# Patient Record
Sex: FEMALE | Race: WHITE | NOT HISPANIC OR LATINO | Employment: OTHER | URBAN - METROPOLITAN AREA
[De-identification: names, ages, dates, MRNs, and addresses within clinical notes are randomized per-mention and may not be internally consistent; named-entity substitution may affect disease eponyms.]

---

## 2018-11-28 ENCOUNTER — APPOINTMENT (OUTPATIENT)
Dept: RADIOLOGY | Facility: MEDICAL CENTER | Age: 78
DRG: 065 | End: 2018-11-28
Attending: EMERGENCY MEDICINE
Payer: COMMERCIAL

## 2018-11-28 ENCOUNTER — HOSPITAL ENCOUNTER (INPATIENT)
Facility: MEDICAL CENTER | Age: 78
LOS: 2 days | DRG: 065 | End: 2018-11-30
Attending: EMERGENCY MEDICINE | Admitting: INTERNAL MEDICINE
Payer: COMMERCIAL

## 2018-11-28 ENCOUNTER — HOSPITAL ENCOUNTER (OUTPATIENT)
Dept: RADIOLOGY | Facility: MEDICAL CENTER | Age: 78
End: 2018-11-28

## 2018-11-28 DIAGNOSIS — I63.9 ACUTE CVA (CEREBROVASCULAR ACCIDENT) (HCC): ICD-10-CM

## 2018-11-28 LAB
ABO GROUP BLD: NORMAL
ALBUMIN SERPL BCP-MCNC: 3.4 G/DL (ref 3.2–4.9)
ALBUMIN/GLOB SERPL: 1.2 G/DL
ALP SERPL-CCNC: 73 U/L (ref 30–99)
ALT SERPL-CCNC: 11 U/L (ref 2–50)
ANION GAP SERPL CALC-SCNC: 7 MMOL/L (ref 0–11.9)
APPEARANCE UR: CLEAR
APTT PPP: 28.6 SEC (ref 24.7–36)
AST SERPL-CCNC: 12 U/L (ref 12–45)
BACTERIA #/AREA URNS HPF: ABNORMAL /HPF
BASOPHILS # BLD AUTO: 0.3 % (ref 0–1.8)
BASOPHILS # BLD: 0.04 K/UL (ref 0–0.12)
BILIRUB SERPL-MCNC: 0.3 MG/DL (ref 0.1–1.5)
BILIRUB UR QL STRIP.AUTO: NEGATIVE
BLD GP AB SCN SERPL QL: NORMAL
BUN SERPL-MCNC: 21 MG/DL (ref 8–22)
CALCIUM SERPL-MCNC: 8.8 MG/DL (ref 8.5–10.5)
CHLORIDE SERPL-SCNC: 107 MMOL/L (ref 96–112)
CO2 SERPL-SCNC: 28 MMOL/L (ref 20–33)
COLOR UR: YELLOW
CREAT SERPL-MCNC: 0.53 MG/DL (ref 0.5–1.4)
EOSINOPHIL # BLD AUTO: 0.13 K/UL (ref 0–0.51)
EOSINOPHIL NFR BLD: 1.1 % (ref 0–6.9)
EPI CELLS #/AREA URNS HPF: ABNORMAL /HPF
ERYTHROCYTE [DISTWIDTH] IN BLOOD BY AUTOMATED COUNT: 46.2 FL (ref 35.9–50)
GLOBULIN SER CALC-MCNC: 2.8 G/DL (ref 1.9–3.5)
GLUCOSE BLD-MCNC: 96 MG/DL (ref 65–99)
GLUCOSE SERPL-MCNC: 104 MG/DL (ref 65–99)
GLUCOSE UR STRIP.AUTO-MCNC: NEGATIVE MG/DL
HCT VFR BLD AUTO: 40.6 % (ref 37–47)
HGB BLD-MCNC: 12.6 G/DL (ref 12–16)
IMM GRANULOCYTES # BLD AUTO: 0.06 K/UL (ref 0–0.11)
IMM GRANULOCYTES NFR BLD AUTO: 0.5 % (ref 0–0.9)
INR PPP: 1.21 (ref 0.87–1.13)
KETONES UR STRIP.AUTO-MCNC: NEGATIVE MG/DL
LEUKOCYTE ESTERASE UR QL STRIP.AUTO: NEGATIVE
LYMPHOCYTES # BLD AUTO: 2.7 K/UL (ref 1–4.8)
LYMPHOCYTES NFR BLD: 23.5 % (ref 22–41)
MCH RBC QN AUTO: 26.3 PG (ref 27–33)
MCHC RBC AUTO-ENTMCNC: 31 G/DL (ref 33.6–35)
MCV RBC AUTO: 84.8 FL (ref 81.4–97.8)
MICRO URNS: ABNORMAL
MONOCYTES # BLD AUTO: 0.91 K/UL (ref 0–0.85)
MONOCYTES NFR BLD AUTO: 7.9 % (ref 0–13.4)
NEUTROPHILS # BLD AUTO: 7.64 K/UL (ref 2–7.15)
NEUTROPHILS NFR BLD: 66.7 % (ref 44–72)
NITRITE UR QL STRIP.AUTO: NEGATIVE
NRBC # BLD AUTO: 0 K/UL
NRBC BLD-RTO: 0 /100 WBC
PH UR STRIP.AUTO: 6.5 [PH]
PLATELET # BLD AUTO: 241 K/UL (ref 164–446)
PMV BLD AUTO: 9.3 FL (ref 9–12.9)
POTASSIUM SERPL-SCNC: 3.8 MMOL/L (ref 3.6–5.5)
PROT SERPL-MCNC: 6.2 G/DL (ref 6–8.2)
PROT UR QL STRIP: NEGATIVE MG/DL
PROTHROMBIN TIME: 15.5 SEC (ref 12–14.6)
RBC # BLD AUTO: 4.79 M/UL (ref 4.2–5.4)
RBC # URNS HPF: ABNORMAL /HPF
RBC UR QL AUTO: ABNORMAL
RH BLD: NORMAL
SODIUM SERPL-SCNC: 142 MMOL/L (ref 135–145)
SP GR UR STRIP.AUTO: 1.01
TROPONIN I SERPL-MCNC: 0.01 NG/ML (ref 0–0.04)
UROBILINOGEN UR STRIP.AUTO-MCNC: 0.2 MG/DL
WBC # BLD AUTO: 11.5 K/UL (ref 4.8–10.8)
WBC #/AREA URNS HPF: ABNORMAL /HPF

## 2018-11-28 PROCEDURE — 700117 HCHG RX CONTRAST REV CODE 255: Performed by: EMERGENCY MEDICINE

## 2018-11-28 PROCEDURE — 70498 CT ANGIOGRAPHY NECK: CPT

## 2018-11-28 PROCEDURE — 99291 CRITICAL CARE FIRST HOUR: CPT

## 2018-11-28 PROCEDURE — 80053 COMPREHEN METABOLIC PANEL: CPT

## 2018-11-28 PROCEDURE — 70496 CT ANGIOGRAPHY HEAD: CPT

## 2018-11-28 PROCEDURE — 85025 COMPLETE CBC W/AUTO DIFF WBC: CPT

## 2018-11-28 PROCEDURE — 93005 ELECTROCARDIOGRAM TRACING: CPT | Performed by: EMERGENCY MEDICINE

## 2018-11-28 PROCEDURE — 81001 URINALYSIS AUTO W/SCOPE: CPT

## 2018-11-28 PROCEDURE — 86900 BLOOD TYPING SEROLOGIC ABO: CPT

## 2018-11-28 PROCEDURE — 85730 THROMBOPLASTIN TIME PARTIAL: CPT

## 2018-11-28 PROCEDURE — 0042T CT-CEREBRAL PERFUSION ANALYSIS: CPT

## 2018-11-28 PROCEDURE — 99223 1ST HOSP IP/OBS HIGH 75: CPT | Mod: AI | Performed by: INTERNAL MEDICINE

## 2018-11-28 PROCEDURE — 94760 N-INVAS EAR/PLS OXIMETRY 1: CPT

## 2018-11-28 PROCEDURE — 86901 BLOOD TYPING SEROLOGIC RH(D): CPT

## 2018-11-28 PROCEDURE — 96365 THER/PROPH/DIAG IV INF INIT: CPT

## 2018-11-28 PROCEDURE — 82962 GLUCOSE BLOOD TEST: CPT

## 2018-11-28 PROCEDURE — 770022 HCHG ROOM/CARE - ICU (200)

## 2018-11-28 PROCEDURE — 84484 ASSAY OF TROPONIN QUANT: CPT

## 2018-11-28 PROCEDURE — 86850 RBC ANTIBODY SCREEN: CPT

## 2018-11-28 PROCEDURE — 85610 PROTHROMBIN TIME: CPT

## 2018-11-28 PROCEDURE — 71045 X-RAY EXAM CHEST 1 VIEW: CPT

## 2018-11-28 PROCEDURE — 700111 HCHG RX REV CODE 636 W/ 250 OVERRIDE (IP): Performed by: EMERGENCY MEDICINE

## 2018-11-28 RX ORDER — NITROGLYCERIN 20 MG/100ML
0-200 INJECTION INTRAVENOUS CONTINUOUS
Status: DISCONTINUED | OUTPATIENT
Start: 2018-11-28 | End: 2018-11-30 | Stop reason: HOSPADM

## 2018-11-28 RX ADMIN — NITROGLYCERIN 5 MCG/MIN: 20 INJECTION INTRAVENOUS at 20:45

## 2018-11-28 RX ADMIN — IOHEXOL 140 ML: 350 INJECTION, SOLUTION INTRAVENOUS at 20:33

## 2018-11-29 ENCOUNTER — APPOINTMENT (OUTPATIENT)
Dept: RADIOLOGY | Facility: MEDICAL CENTER | Age: 78
DRG: 065 | End: 2018-11-29
Attending: INTERNAL MEDICINE
Payer: COMMERCIAL

## 2018-11-29 ENCOUNTER — APPOINTMENT (OUTPATIENT)
Dept: CARDIOLOGY | Facility: MEDICAL CENTER | Age: 78
DRG: 065 | End: 2018-11-29
Attending: INTERNAL MEDICINE
Payer: COMMERCIAL

## 2018-11-29 PROBLEM — Z72.0 TOBACCO USE: Status: ACTIVE | Noted: 2018-11-29

## 2018-11-29 PROBLEM — E78.5 HLD (HYPERLIPIDEMIA): Status: ACTIVE | Noted: 2018-11-29

## 2018-11-29 PROBLEM — I10 ESSENTIAL HYPERTENSION: Status: ACTIVE | Noted: 2018-11-29

## 2018-11-29 PROBLEM — E04.1 THYROID NODULE: Status: ACTIVE | Noted: 2018-11-29

## 2018-11-29 PROBLEM — D72.829 LEUKOCYTOSIS: Status: ACTIVE | Noted: 2018-11-29

## 2018-11-29 PROBLEM — I63.9 CVA (CEREBRAL VASCULAR ACCIDENT) (HCC): Status: ACTIVE | Noted: 2018-11-29

## 2018-11-29 PROBLEM — M06.9 RHEUMATOID ARTHRITIS (HCC): Status: ACTIVE | Noted: 2018-11-29

## 2018-11-29 LAB
ABO GROUP BLD: NORMAL
LV EJECT FRACT  99904: 65
LV EJECT FRACT MOD 2C 99903: 70.85
LV EJECT FRACT MOD 4C 99902: 65.3
LV EJECT FRACT MOD BP 99901: 68.34
RH BLD: NORMAL

## 2018-11-29 PROCEDURE — 99291 CRITICAL CARE FIRST HOUR: CPT | Performed by: INTERNAL MEDICINE

## 2018-11-29 PROCEDURE — A9270 NON-COVERED ITEM OR SERVICE: HCPCS | Performed by: HOSPITALIST

## 2018-11-29 PROCEDURE — G8996 SWALLOW CURRENT STATUS: HCPCS | Mod: CK

## 2018-11-29 PROCEDURE — 93306 TTE W/DOPPLER COMPLETE: CPT | Mod: 26 | Performed by: INTERNAL MEDICINE

## 2018-11-29 PROCEDURE — 700102 HCHG RX REV CODE 250 W/ 637 OVERRIDE(OP): Performed by: INTERNAL MEDICINE

## 2018-11-29 PROCEDURE — 70551 MRI BRAIN STEM W/O DYE: CPT

## 2018-11-29 PROCEDURE — A9270 NON-COVERED ITEM OR SERVICE: HCPCS | Performed by: INTERNAL MEDICINE

## 2018-11-29 PROCEDURE — 73030 X-RAY EXAM OF SHOULDER: CPT | Mod: RT

## 2018-11-29 PROCEDURE — 700102 HCHG RX REV CODE 250 W/ 637 OVERRIDE(OP): Performed by: HOSPITALIST

## 2018-11-29 PROCEDURE — 99221 1ST HOSP IP/OBS SF/LOW 40: CPT | Performed by: PSYCHIATRY & NEUROLOGY

## 2018-11-29 PROCEDURE — 99233 SBSQ HOSP IP/OBS HIGH 50: CPT | Performed by: HOSPITALIST

## 2018-11-29 PROCEDURE — 93306 TTE W/DOPPLER COMPLETE: CPT

## 2018-11-29 PROCEDURE — 92610 EVALUATE SWALLOWING FUNCTION: CPT

## 2018-11-29 PROCEDURE — 99292 CRITICAL CARE ADDL 30 MIN: CPT | Performed by: INTERNAL MEDICINE

## 2018-11-29 PROCEDURE — 700105 HCHG RX REV CODE 258: Performed by: INTERNAL MEDICINE

## 2018-11-29 PROCEDURE — 770022 HCHG ROOM/CARE - ICU (200)

## 2018-11-29 PROCEDURE — G8997 SWALLOW GOAL STATUS: HCPCS | Mod: CH

## 2018-11-29 PROCEDURE — 700111 HCHG RX REV CODE 636 W/ 250 OVERRIDE (IP): Performed by: HOSPITALIST

## 2018-11-29 PROCEDURE — 700111 HCHG RX REV CODE 636 W/ 250 OVERRIDE (IP): Performed by: INTERNAL MEDICINE

## 2018-11-29 RX ORDER — PANTOPRAZOLE SODIUM 20 MG/1
20 TABLET, DELAYED RELEASE ORAL DAILY
COMMUNITY

## 2018-11-29 RX ORDER — BISACODYL 10 MG
10 SUPPOSITORY, RECTAL RECTAL
Status: DISCONTINUED | OUTPATIENT
Start: 2018-11-29 | End: 2018-11-30 | Stop reason: HOSPADM

## 2018-11-29 RX ORDER — ASPIRIN 300 MG/1
300 SUPPOSITORY RECTAL DAILY
Status: DISCONTINUED | OUTPATIENT
Start: 2018-11-30 | End: 2018-11-29

## 2018-11-29 RX ORDER — OMEPRAZOLE 20 MG/1
20 CAPSULE, DELAYED RELEASE ORAL DAILY
Status: DISCONTINUED | OUTPATIENT
Start: 2018-11-29 | End: 2018-11-30 | Stop reason: HOSPADM

## 2018-11-29 RX ORDER — ONDANSETRON 2 MG/ML
4 INJECTION INTRAMUSCULAR; INTRAVENOUS EVERY 4 HOURS PRN
Status: DISCONTINUED | OUTPATIENT
Start: 2018-11-29 | End: 2018-11-30 | Stop reason: HOSPADM

## 2018-11-29 RX ORDER — ATORVASTATIN CALCIUM 40 MG/1
40 TABLET, FILM COATED ORAL EVERY EVENING
Status: DISCONTINUED | OUTPATIENT
Start: 2018-11-29 | End: 2018-11-30 | Stop reason: HOSPADM

## 2018-11-29 RX ORDER — MECLIZINE HYDROCHLORIDE 25 MG/1
25 TABLET ORAL 3 TIMES DAILY PRN
Status: ON HOLD | COMMUNITY
End: 2018-11-30

## 2018-11-29 RX ORDER — SODIUM CHLORIDE 9 MG/ML
INJECTION, SOLUTION INTRAVENOUS CONTINUOUS
Status: DISCONTINUED | OUTPATIENT
Start: 2018-11-29 | End: 2018-11-30 | Stop reason: HOSPADM

## 2018-11-29 RX ORDER — PREDNISONE 20 MG/1
20 TABLET ORAL DAILY
COMMUNITY

## 2018-11-29 RX ORDER — POLYETHYLENE GLYCOL 3350 17 G/17G
1 POWDER, FOR SOLUTION ORAL
Status: DISCONTINUED | OUTPATIENT
Start: 2018-11-29 | End: 2018-11-30 | Stop reason: HOSPADM

## 2018-11-29 RX ORDER — ASPIRIN 325 MG
325 TABLET ORAL DAILY
Status: DISCONTINUED | OUTPATIENT
Start: 2018-11-29 | End: 2018-11-30 | Stop reason: HOSPADM

## 2018-11-29 RX ORDER — ASPIRIN 81 MG/1
324 TABLET, CHEWABLE ORAL DAILY
Status: DISCONTINUED | OUTPATIENT
Start: 2018-11-29 | End: 2018-11-30 | Stop reason: HOSPADM

## 2018-11-29 RX ORDER — AMOXICILLIN 250 MG
2 CAPSULE ORAL 2 TIMES DAILY
Status: DISCONTINUED | OUTPATIENT
Start: 2018-11-29 | End: 2018-11-30 | Stop reason: HOSPADM

## 2018-11-29 RX ORDER — PANTOPRAZOLE SODIUM 20 MG/1
20 TABLET, DELAYED RELEASE ORAL DAILY
Status: DISCONTINUED | OUTPATIENT
Start: 2018-11-29 | End: 2018-11-29

## 2018-11-29 RX ORDER — ASPIRIN 81 MG/1
324 TABLET, CHEWABLE ORAL DAILY
Status: DISCONTINUED | OUTPATIENT
Start: 2018-11-30 | End: 2018-11-29

## 2018-11-29 RX ORDER — HYDRALAZINE HYDROCHLORIDE 20 MG/ML
10 INJECTION INTRAMUSCULAR; INTRAVENOUS
Status: DISCONTINUED | OUTPATIENT
Start: 2018-11-29 | End: 2018-11-30 | Stop reason: HOSPADM

## 2018-11-29 RX ORDER — HYDROXYCHLOROQUINE SULFATE 200 MG/1
400 TABLET, FILM COATED ORAL 2 TIMES DAILY
Status: DISCONTINUED | OUTPATIENT
Start: 2018-11-29 | End: 2018-11-30 | Stop reason: HOSPADM

## 2018-11-29 RX ORDER — LOSARTAN POTASSIUM 50 MG/1
75 TABLET ORAL DAILY
COMMUNITY

## 2018-11-29 RX ORDER — ASPIRIN 325 MG
325 TABLET ORAL DAILY
Status: DISCONTINUED | OUTPATIENT
Start: 2018-11-30 | End: 2018-11-29

## 2018-11-29 RX ORDER — HYDROXYCHLOROQUINE SULFATE 200 MG/1
400 TABLET, FILM COATED ORAL 2 TIMES DAILY
COMMUNITY

## 2018-11-29 RX ORDER — CELECOXIB 200 MG/1
200 CAPSULE ORAL 2 TIMES DAILY
Status: ON HOLD | COMMUNITY
End: 2018-11-30

## 2018-11-29 RX ORDER — PREDNISONE 20 MG/1
20 TABLET ORAL DAILY
Status: DISCONTINUED | OUTPATIENT
Start: 2018-11-29 | End: 2018-11-30 | Stop reason: HOSPADM

## 2018-11-29 RX ORDER — ONDANSETRON 4 MG/1
4 TABLET, ORALLY DISINTEGRATING ORAL EVERY 4 HOURS PRN
Status: DISCONTINUED | OUTPATIENT
Start: 2018-11-29 | End: 2018-11-30 | Stop reason: HOSPADM

## 2018-11-29 RX ORDER — ASPIRIN 300 MG/1
300 SUPPOSITORY RECTAL DAILY
Status: DISCONTINUED | OUTPATIENT
Start: 2018-11-29 | End: 2018-11-30 | Stop reason: HOSPADM

## 2018-11-29 RX ADMIN — SODIUM CHLORIDE: 9 INJECTION, SOLUTION INTRAVENOUS at 00:43

## 2018-11-29 RX ADMIN — ASPIRIN 325 MG: 325 TABLET, COATED ORAL at 18:38

## 2018-11-29 RX ADMIN — ATORVASTATIN CALCIUM 40 MG: 40 TABLET, FILM COATED ORAL at 16:59

## 2018-11-29 RX ADMIN — ENOXAPARIN SODIUM 40 MG: 100 INJECTION SUBCUTANEOUS at 19:14

## 2018-11-29 RX ADMIN — PREDNISONE 20 MG: 20 TABLET ORAL at 16:59

## 2018-11-29 RX ADMIN — HYDROXYCHLOROQUINE SULFATE 400 MG: 200 TABLET, FILM COATED ORAL at 17:12

## 2018-11-29 RX ADMIN — OMEPRAZOLE 20 MG: 20 CAPSULE, DELAYED RELEASE ORAL at 16:59

## 2018-11-29 RX ADMIN — STANDARDIZED SENNA CONCENTRATE AND DOCUSATE SODIUM 2 TABLET: 8.6; 5 TABLET, FILM COATED ORAL at 17:00

## 2018-11-29 ASSESSMENT — LIFESTYLE VARIABLES
EVER_SMOKED: YES
HAVE YOU EVER FELT YOU SHOULD CUT DOWN ON YOUR DRINKING: NO
HAVE PEOPLE ANNOYED YOU BY CRITICIZING YOUR DRINKING: NO
AVERAGE NUMBER OF DAYS PER WEEK YOU HAVE A DRINK CONTAINING ALCOHOL: 1
ON A TYPICAL DAY WHEN YOU DRINK ALCOHOL HOW MANY DRINKS DO YOU HAVE: 0
TOTAL SCORE: 0
CONSUMPTION TOTAL: POSITIVE
EVER FELT BAD OR GUILTY ABOUT YOUR DRINKING: NO
SUBSTANCE_ABUSE: 0
TOTAL SCORE: 0
HOW MANY TIMES IN THE PAST YEAR HAVE YOU HAD 5 OR MORE DRINKS IN A DAY: 1
ALCOHOL_USE: YES
EVER_SMOKED: YES
EVER HAD A DRINK FIRST THING IN THE MORNING TO STEADY YOUR NERVES TO GET RID OF A HANGOVER: NO
TOTAL SCORE: 0

## 2018-11-29 ASSESSMENT — ENCOUNTER SYMPTOMS
FEVER: 0
FLANK PAIN: 0
NUMBNESS: 0
FOCAL WEAKNESS: 1
COUGH: 0
DIARRHEA: 0
EYE PAIN: 0
SEIZURES: 0
FACIAL ASYMMETRY: 1
CHILLS: 0
NERVOUS/ANXIOUS: 0
NAUSEA: 0
SHORTNESS OF BREATH: 0
EYE DISCHARGE: 0
WHEEZING: 0
VOMITING: 0
FATIGUE: 0
ABDOMINAL PAIN: 0
EYE REDNESS: 0
FOCAL WEAKNESS: 0
DIZZINESS: 1
BACK PAIN: 1
SPUTUM PRODUCTION: 0
WEAKNESS: 1
NECK PAIN: 0
BLOOD IN STOOL: 0
LIGHT-HEADEDNESS: 0
BRUISES/BLEEDS EASILY: 0
BACK PAIN: 0
FALLS: 0
MYALGIAS: 0
BLURRED VISION: 0
SPEECH DIFFICULTY: 0
ORTHOPNEA: 0
SORE THROAT: 0
PALPITATIONS: 0
HALLUCINATIONS: 0
STRIDOR: 0
LOSS OF CONSCIOUSNESS: 0
SPEECH CHANGE: 0
DIAPHORESIS: 0
HEMOPTYSIS: 0
HEADACHES: 0
MEMORY LOSS: 0

## 2018-11-29 ASSESSMENT — COPD QUESTIONNAIRES
DURING THE PAST 4 WEEKS HOW MUCH DID YOU FEEL SHORT OF BREATH: NONE/LITTLE OF THE TIME
HAVE YOU SMOKED AT LEAST 100 CIGARETTES IN YOUR ENTIRE LIFE: YES
COPD SCREENING SCORE: 4
DO YOU EVER COUGH UP ANY MUCUS OR PHLEGM?: NO/ONLY WITH OCCASIONAL COLDS OR INFECTIONS

## 2018-11-29 ASSESSMENT — PAIN SCALES - GENERAL
PAINLEVEL_OUTOF10: 0

## 2018-11-29 ASSESSMENT — PATIENT HEALTH QUESTIONNAIRE - PHQ9
1. LITTLE INTEREST OR PLEASURE IN DOING THINGS: NOT AT ALL
2. FEELING DOWN, DEPRESSED, IRRITABLE, OR HOPELESS: NOT AT ALL
SUM OF ALL RESPONSES TO PHQ9 QUESTIONS 1 AND 2: 0

## 2018-11-29 NOTE — PROGRESS NOTES
· 2 RN skin check complete.   · Devices in place BP cuff, Silicone nasal cannula, SpO2 clip probe, Silva catheter, SCDs.  · Skin assessed under devices YES.  · Confirmed pressure ulcers found on n/a. Red but blanching elbows and redness/scab on R foot noted see assessment charting.  · New potential pressure ulcers noted on n/a.  · The following interventions in place Q2hr turns, pillows in use for support and positioning, low air loss mattress, heels floated off bed.

## 2018-11-29 NOTE — NON-PROVIDER
"CHIEF COMPLAINT:   \"stroke\" - but has no clue     HISTORY OF PRESENTING ILLNESS:   This afternoon patient was driving with her daughter to [employer] when she suddenly felt visually dizzy. She said the episode lasted for around 30 min and she subsequently let her daughter drive them home afterwards. That's when her recollection fades. She was able to tell the year and was also about to eat whole foods today so presents with no dysphasia. The symptoms suddenly happened. She's not able to recall anything else. She says she just woke up in the hospital afterwards. She did have a ministroke a few years ago and is a life time smoker. She's been smoking since the age of 20 and continues at about a half pack per day. She currently descries her vision as cross - eyed and sees two of everything.     CURRENT OUTPATIENT MEDICATIONS:   No current facility-administered medications on file prior to encounter.      No current outpatient prescriptions on file prior to encounter.       HOSPITAL MEDICATIONS:    Current Facility-Administered Medications:   •  nitroglycerin in D5W    Current Outpatient Prescriptions:   •  Celecoxib (CELEBREX PO), 1 Cap, Oral, BID, 11/28/2018 at 1000  •  PREDNISONE PO, 1 Tab, Oral, QAM, 11/28/2018 at 1000  •  LOVASTATIN PO, 1 Tab, Oral, QAM, 11/28/2018 at 1000      ALLERGIES:   No Known Allergies    PAST MEDICAL HISTORY:  Mini stroke   PAST SURGICAL HISTORY:  Hip replacements and shoulder surgery     SOCIAL HISTORY:  Social History     Social History   • Marital status:      Spouse name: N/A   • Number of children: N/A   • Years of education: N/A     Occupational History   • Not on file.     Social History Main Topics   • Smoking status: Not on file   • Smokeless tobacco: Not on file   • Alcohol use Not on file   • Drug use: Unknown   • Sexual activity: Not on file     Other Topics Concern   • Not on file     Social History Narrative   • No narrative on file       FAMILY HISTORY:  Not pertinent " "  REVIEW OF SYSTEMS:  Back pain due to the hospital bed     VITALS:  Weight/BMI: Body mass index is 27.44 kg/m².  Blood pressure 145/95, pulse 82, temperature 36.5 °C (97.7 °F), temperature source Temporal, resp. rate 15, height 1.676 m (5' 6\"), weight 77.1 kg (170 lb), SpO2 92 %.  Vitals:    11/28/18 2225 11/28/18 2230 11/28/18 2255 11/28/18 2325   BP:       Pulse: 74 76 80 82   Resp: 16 15 16 15   Temp:       TempSrc:       SpO2: 99% 98% 97% 92%   Weight:       Height:         Oxygen Therapy:  Pulse Oximetry: 92 %, O2 Delivery: None (Room Air)    PHYSICAL EXAM:  General - well developed, well nourished, no acute cardiorespiratory distress, AAOx4, anicteric, acyanotic***  HEENT: no conjunctival pallor, mucous membranes pink and moist, no lymphadenopathy, no thyromegaly or tenderness on palpation, no jugular venous distention***  Skin - warm, dry, no erythema ***  Cardiovascular - normal rate, normal rhythm, no murmurs heard, extremities with intact distal pulses, no edema ***  Respiratory - normal breath sounds, no shortness of breath, no wheezing, no rales, bilateral chest rise and fall ***  Gastrointestinal - soft, non-tender, non-distended, normal bowel sounds, no abdominal pain, no palpable hepatosplenomegaly, no masses ***  Musculoskeletal - normal muscle tone, no obvious deformities ***  Neurological - no tremor, normal judgement, affect, and mood ***      She has right sided weakness; facial droop on her left side; eyelid droop on her left side as well    She appears to have no sensory disturbances    Visual - crosseyed     coordintion - able to touch finger and hold hands out     prsents with no dysphagia    Denies headache    No nasuea comiting    Back pain - from the bed    Smokiong, aorting issues, drinks occasionally    Mild abdoninlal pain - getting a colonscopy done     LABS:                  ASSESSMENT and PLAN:      "

## 2018-11-29 NOTE — ED NOTES
Med rec partially completed.   Antibiotics within last 30 days: No  Patient allergies have been reviewed: NKDA  Comments: Pt unable to provide medications strengths. Med tech will attempt to follow up with pt's outpatient pharmacy.

## 2018-11-29 NOTE — CARE PLAN
Problem: Skin Integrity  Goal: Risk for impaired skin integrity will decrease    Intervention: Assess risk factors for impaired skin integrity and/or pressure ulcers  Patient is on a 24 hour bed rest post TPA. Patient is turned and repositioned every two hours using draw sheet and pillows. Extremities are resting on pillows. Pulse ox and BP cuff rotated.      Problem: Nutritional:  Goal: Dysphagia will improve    Intervention: Collaborate with SLP to determine appropriate adaptation for safe administration of medications and oral nutrition  Collaborating with SLP during swallow eval. Patient is able to tolerate a nectar thick full liquid diet. Medications are given with thick full liquids.

## 2018-11-29 NOTE — H&P
Hospital Medicine History & Physical Note    Date of Service  11/28/2018    Primary Care Physician  No primary care provider on file.    Consultants  Critical care    Code Status  Full code    Chief Complaint  Dizziness and right-sided weakness    History of Presenting Illness  77 y.o. female who presented 11/28/2018 with right-sided weakness and right facial droop that started earlier this afternoon.  Patient was last seen normal at 1630.  The daughter found her mother to have right-sided weakness and right facial droop.  The patient reported some dizziness and double vision that lasted for about 30 minutes.  [facility] without contrast was negative for acute process, she was determined to be having an ischemic stroke and was given IV TPA.  She was transferred to [facility] for further management.  In the ER she underwent CT head with IV contrast that revealed atherosclerotic intracranial calcification of the internal carotid arteries with less than 50% stenosis and hypoplastic distal left vertebral artery.  CTA neck with IV contrast reveals atherosclerotic calcification of the left common carotid artery and bilateral carotid bifurcation less than 50% stenosis.  CT perfusion scan was negative for ischemia or infarct.  At this time her right-sided weakness has improved.    EKG interpreted by me reveals sinus rhythm with ventricular premature complex.  No ST elevation or ST depression noted  Chest x-ray interpreted by me reveals no acute cardiopulmonary process    Review of Systems  Review of Systems   Constitutional: Negative for chills, diaphoresis and fever.   HENT: Negative for hearing loss and sore throat.    Eyes: Negative for blurred vision.   Respiratory: Negative for cough, sputum production, shortness of breath and wheezing.    Cardiovascular: Negative for chest pain, palpitations and leg swelling.   Gastrointestinal: Negative for abdominal pain, blood in stool,  diarrhea, nausea and vomiting.   Genitourinary: Negative for dysuria, flank pain and urgency.   Musculoskeletal: Negative for back pain, joint pain, myalgias and neck pain.   Skin: Negative for rash.   Neurological: Positive for dizziness and focal weakness. Negative for seizures and headaches.   Endo/Heme/Allergies: Does not bruise/bleed easily.   Psychiatric/Behavioral: Negative for suicidal ideas.   All other systems reviewed and are negative.      Past Medical History  Hyperlipidemia    Surgical History  No pertinent surgical history    Family History  No pertinent family history    Social History   Denies tobacco use.  Drinks alcohol occasionally.  Denies illicit drug use    Allergies  No Known Allergies    Medications  Prior to Admission Medications   Prescriptions Last Dose Informant Patient Reported? Taking?   Celecoxib (CELEBREX PO) 11/28/2018 at 1000  Yes Yes   Sig: Take 1 Cap by mouth 2 Times a Day.   LOVASTATIN PO 11/28/2018 at 1000  Yes Yes   Sig: Take 1 Tab by mouth every morning.   PREDNISONE PO 11/28/2018 at 1000  Yes Yes   Sig: Take 1 Tab by mouth every morning.      Facility-Administered Medications: None       Physical Exam  Temp:  [36.4 °C (97.6 °F)-36.5 °C (97.7 °F)] 36.4 °C (97.6 °F)  Pulse:  [74-85] 74  Resp:  [12-20] 20  BP: (145)/(95) 145/95    Physical Exam   Constitutional: She is oriented to person, place, and time. She appears well-developed and well-nourished. No distress.   HENT:   Head: Normocephalic and atraumatic.   Mouth/Throat: Oropharynx is clear and moist.   Eyes: Pupils are equal, round, and reactive to light. Conjunctivae are normal.   Neck: Neck supple. No thyromegaly present.   Cardiovascular: Normal rate, regular rhythm and normal heart sounds.    Pulmonary/Chest: Effort normal and breath sounds normal. No respiratory distress. She has no wheezes. She has no rales.   Abdominal: Soft. Bowel sounds are normal. She exhibits no distension. There is no tenderness. There is no  rebound.   Musculoskeletal: Normal range of motion. She exhibits no edema or tenderness.   Neurological: She is alert and oriented to person, place, and time. No cranial nerve deficit. Coordination normal.   Right upper extremity strength 4/5  Left upper extremity strength 5/5  Right upper extremity strength 4/5  Left upper extremity strength 5/5  Sensation intact bilaterally   Skin: Skin is warm and dry.   Psychiatric: She has a normal mood and affect. Her behavior is normal.   Nursing note and vitals reviewed.      Laboratory:  Recent Labs      11/28/18 2007   WBC  11.5*   RBC  4.79   HEMOGLOBIN  12.6   HEMATOCRIT  40.6   MCV  84.8   MCH  26.3*   MCHC  31.0*   RDW  46.2   PLATELETCT  241   MPV  9.3     Recent Labs      11/28/18 2007   SODIUM  142   POTASSIUM  3.8   CHLORIDE  107   CO2  28   GLUCOSE  104*   BUN  21   CREATININE  0.53   CALCIUM  8.8     Recent Labs      11/28/18 2007   ALTSGPT  11   ASTSGOT  12   ALKPHOSPHAT  73   TBILIRUBIN  0.3   GLUCOSE  104*     Recent Labs      11/28/18 2007   APTT  28.6   INR  1.21*             Recent Labs      11/28/18 2007   TROPONINI  0.01       Urinalysis:    Recent Labs      11/28/18   2132   SPECGRAVITY  1.010   GLUCOSEUR  Negative   KETONES  Negative   NITRITE  Negative   LEUKESTERAS  Negative   WBCURINE  2-5   RBCURINE  2-5*   BACTERIA  Many*   EPITHELCELL  Rare        Imaging:  DX-CHEST-PORTABLE (1 VIEW)   Final Result         1.  No acute cardiopulmonary disease.   2.  Atherosclerosis      CT-CTA NECK WITH & W/O-POST PROCESSING   Final Result         1.  Atherosclerotic calcification of the left common carotid artery and bilateral carotid bifurcation with less than 50% stenosis   2.  Hypoplastic left vertebral artery   3.  Otherwise unremarkable CT angiogram of the neck   4.  Right thyroid low density lesion, appearance suggests small cyst or hypodense nodule. Could be further evaluated with thyroid sonography.      CT-CTA HEAD WITH & W/O-POST PROCESS    Final Result         1.  Atherosclerotic intracranial calcification of the internal carotid arteries with less than 50% stenosis   2.  Hypoplastic distal left vertebral artery   3.  Otherwise CT angiogram of the [place] within normal limits.      OUTSIDE IMAGES-CT HEAD   Final Result      CT-CEREBRAL PERFUSION ANALYSIS   Final Result         1.  Cerebral blood flow less than 30% likely representing completed infarct = 0 mL.      2.  T Max more than 6 seconds likely representing combination of completed infarct and ischemia = 0 mL.      3.  Mismatched volume likely representing ischemic brain/pneumbra = 0 mL.      4.  Please note that the cerebral perfusion was performed on the limited brain tissue around the basal ganglia region. Infarct/ischemia outside the CT perfusion sections can be missed in this study.      EC-ECHOCARDIOGRAM COMPLETE W/O CONT    (Results Pending)   MR-BRAIN-W/O    (Results Pending)         Assessment/Plan:  I anticipate this patient will require at least two midnights for appropriate medical management, necessitating inpatient admission.    CVA (cerebral vascular accident) (HCC)- (present on admission)   Assessment & Plan    Status post TPA  Patient will be admitted to the ICU with continuous cardiac monitoring and every hour neuro checks  Strict blood pressure control with IV hydralazine and nitroglycerin drip, titrate to SBP less than 180  I will order MRI brain  Check 2-D echo  Patient will be started on full dose aspirin and DVT prophylaxis with Lovenox 24 hours after alteplase infusion   I have started on high intensity statin  Check TSH, lipid panel and hemoglobin A1c  PTOT and ST evaluation         Leukocytosis- (present on admission)   Assessment & Plan    Likely reactive  No evidence of infection at this time  Monitor CBC and vitals     HLD (hyperlipidemia)- (present on admission)   Assessment & Plan    Started on atorvastatin 40 mg daily         VTE prophylaxis: scd

## 2018-11-29 NOTE — ED NOTES
Report from Du RN.  Pt to room at 2030.  TPA stopped at 1947.  See Alteplase worksheet.  Vitals not charted for 2010 and 2025 due to patient being transported by trauma RN from charge desk to CT and to [facility].  NIHSS completed by ERP and ER RN at 2030.

## 2018-11-29 NOTE — PROGRESS NOTES
Report received from ED RN, neuro assessment completed in ED, pt transported to RICU 107 on monitor with ACLS RN and CCT, pt confirmed that she had no belongings with her, VSS, 2 L NC.

## 2018-11-29 NOTE — CARE PLAN
Problem: Acute Care of the Stroke Patient  Goal: Optimal Outcome for the Stroke patient    Intervention: Vital Signs and Neuro Checks per order  Completed, in process  Intervention: NIHSS completed and documented  NIHSS completed  Intervention: Telemetry monitoring (discontinue after 24 hours unless contraindicated. If monitoring required beyond 24 hours obtain order for monitored bed)  Pt on continuous EKG monitoring   Intervention: CT Scan results (document hemorrhage or no)  No hemmorhage, CT complete  Intervention: Consider MRI/Consider MRA  MRI ordered  Intervention: Consider Echocardiogram  ECHO ordered  Intervention: PT, PTT, INR per anticoagulation orders if applicable  complete  Intervention: Neuro Consult  Neuro consult ordered

## 2018-11-29 NOTE — ASSESSMENT & PLAN NOTE
Allow for permissive hypertension  PRN hydralazine for SBP greater than 180 or DBP greater than 105

## 2018-11-29 NOTE — THERAPY
"Speech Language Therapy Clinical Swallow Evaluation completed.  Functional Status: Patient strict NPO pending evaluation and eager for PO trials. Patient noted to have slight labial droop at rest, but this appears much improved s/p tPA compared to initial notes. Patient with no other gross deficits during oral motor evaluation. Patient consumed PO trials of single ice chips, NTL via tsp, cup sip, and straw, purees, pudding, soft solids, and thin liquids via tsp and cup sip. Patient presented with intermittent throat clearing on thins via cup sip. Coughing was noted x2 out of 4 bites of soft solids. No other overt s/sx of aspiration were noted on any other consistencies trialed. Laryngeal elevation palpated as weak. Patient required assistance with feeding d/t RUE weakness. Recommend patient start NTFL diet with 1:1 assistance for feeding. Crush meds in puree. RN aware. SLP to follow. Thank you for the consult.     Recommendations - Diet: Nectar Thick Full Liquid                          Strategies: Strict 1:1 feeding, Assistance needed for meal tray set-up and Head of Bed at 90 Degrees                          Medication Administration: Crush all Medications in Puree  Plan of Care: Will benefit from Speech Therapy 5 times per week  Post-Acute Therapy: Recommend inpatient transitional care services for continued speech therapy services. Please consider physiatry (PM&R) consult.       See \"Rehab Therapy-Acute\" Patient Summary Report for complete documentation.   "

## 2018-11-29 NOTE — ED PROVIDER NOTES
ED Provider Note    CHIEF COMPLAINT  Chief Complaint   Patient presents with   • Possible Stroke     tpa given, right sided weakness, left sided facial droop.        BUDDY Nolan is a 77 y.o. female who presents for evaluation of right-sided weakness.  Onset was around this afternoon and was apparently witnessed although it is unclear who witnessed.  Patient was seen at the outside facility determined to be having an ischemic stroke and given TPA.  She was then transferred by helicopter medevac to this facility.  Patient was met at the charge desk by me, she appeared drowsy but was arousable.  She had right-sided facial droop and right-sided extremity weakness upon brief neurologic exam and was taken directly to CT for perfusion and angiography imaging.    REVIEW OF SYSTEMS  Constitutional: No recent fevers  Skin: No rashes  HEENT: No ear pain, ringing in ears, or decreased hearing. No sore throat, runny nose, sores, trouble swallowing, trouble speaking.  Neck: No neck pain, stiffness, or masses.  Chest: No pain or rashes  Pulm: No shortness of breath, cough, wheezing, stridor, or pain with inspiration/expiration  Gastrointestinal: No nausea, vomiting, diarrhea  Genitourinary: No dysuria or hematuria  Musculoskeletal: No recent trauma, pain, swelling, weakness  Neurologic: Right-sided weakness, disorientation  Heme: No bleeding or bruising problems.   Immuno: No hx of recurrent infections      PAST MEDICAL HISTORY       SOCIAL HISTORY  Social History     Social History Main Topics   • Smoking status: Not on file   • Smokeless tobacco: Not on file   • Alcohol use Not on file   • Drug use: Unknown   • Sexual activity: Not on file       SURGICAL HISTORY  patient denies any surgical history    CURRENT MEDICATIONS  Home Medications     Reviewed by Radhika Bolden (Pharmacy Tech) on 11/28/18 at 2316  Med List Status: Partial   Medication Last Dose Status   Celecoxib (CELEBREX PO) 11/28/2018 Active   LOVASTATIN PO  "11/28/2018 Active   PREDNISONE PO 11/28/2018 Active                ALLERGIES  No Known Allergies    PHYSICAL EXAM  VITAL SIGNS: /95   Pulse 75   Temp 36.5 °C (97.7 °F) (Temporal)   Resp 16   Ht 1.676 m (5' 6\")   Wt 77.1 kg (170 lb)   SpO2 97%   BMI 27.44 kg/m²    Gen: Appears tired, groggy  HEENT: No signs of trauma, Bilateral external ears normal, Nose normal. Conjunctiva normal, Non-icteric.  PERRLA, EOMI  Neck:  No tenderness, Supple, No masses  Lymphatic: No cervical lymphadenopathy noted.   Cardiovascular: Regular rate and rhythm  Thorax & Lungs: Normal breath sounds, No respiratory distress, No wheezing bilateral chest rise  Abdomen: Bowel sounds normal, Soft, No tenderness, No masses, No pulsatile masses. No Guarding or rebound  Skin: Warm, Dry, No erythema, No rash.   Extremities: Intact distal pulses, No edema  Neurologic:CN 2: Visual acuity grossly intact, visual fields grossly intact  CN 2-3: Pupils equal round reactive to light and accommodation  CN 3, 4, 6: Extraocular eye movements intact  CN 5: Facial sensation intact to light touch bilaterally  CN 7: Face somewhat asymmetric with droop on the right  CN 8: Hearing intact to finger rub bilaterally  CN 9,10: Swallowing normally, soft palate elevates normally  CN 4, 7, 10, 12: Weak voice with some dysarthria and slurring of speech  CN 11: Strong shoulder shrug, good strength with head rotation  CN 12: No deviation of the tongue    Motor:   RUE: 4 out of 5 strength proximally and distally, mild pronator and arm drift  LUE:5 out of 5 strength proximally and distally, no pronator or arm drift  RLE:5 out of 5 strength proximally and distally, no leg drift  LLE:5 out of 5 strength proximally and distally, no leg drift    Sensory:  RUE: Sensation intact to light touch, equal bilat  RLE:  Sensation intact to light touch, equal bilat  LUE: Sensation intact to light touch, equal bilat  LLE: Sensation intact to light touch, equal bilat    Finger to " nose demonstrates mild ataxia on the right, no ataxia on the left.  Heel to shin difficult for the patient due to generalized debility but, despite being a bit slow, patient is able to coordinate both lower extremities  Psychiatric: Affect normal, Judgment normal, Mood normal.       INITIAL IMPRESSION  Patient has a findings suggestive of an acute ischemic CVA which is at least partially resolved.  She is noted to have an NIH score of 8 by my evaluation.    LABS  Results for orders placed or performed during the hospital encounter of 11/28/18   CBC WITH DIFFERENTIAL   Result Value Ref Range    WBC 11.5 (H) 4.8 - 10.8 K/uL    RBC 4.79 4.20 - 5.40 M/uL    Hemoglobin 12.6 12.0 - 16.0 g/dL    Hematocrit 40.6 37.0 - 47.0 %    MCV 84.8 81.4 - 97.8 fL    MCH 26.3 (L) 27.0 - 33.0 pg    MCHC 31.0 (L) 33.6 - 35.0 g/dL    RDW 46.2 35.9 - 50.0 fL    Platelet Count 241 164 - 446 K/uL    MPV 9.3 9.0 - 12.9 fL    Neutrophils-Polys 66.70 44.00 - 72.00 %    Lymphocytes 23.50 22.00 - 41.00 %    Monocytes 7.90 0.00 - 13.40 %    Eosinophils 1.10 0.00 - 6.90 %    Basophils 0.30 0.00 - 1.80 %    Immature Granulocytes 0.50 0.00 - 0.90 %    Nucleated RBC 0.00 /100 WBC    Neutrophils (Absolute) 7.64 (H) 2.00 - 7.15 K/uL    Lymphs (Absolute) 2.70 1.00 - 4.80 K/uL    Monos (Absolute) 0.91 (H) 0.00 - 0.85 K/uL    Eos (Absolute) 0.13 0.00 - 0.51 K/uL    Baso (Absolute) 0.04 0.00 - 0.12 K/uL    Immature Granulocytes (abs) 0.06 0.00 - 0.11 K/uL    NRBC (Absolute) 0.00 K/uL   COMP METABOLIC PANEL   Result Value Ref Range    Sodium 142 135 - 145 mmol/L    Potassium 3.8 3.6 - 5.5 mmol/L    Chloride 107 96 - 112 mmol/L    Co2 28 20 - 33 mmol/L    Anion Gap 7.0 0.0 - 11.9    Glucose 104 (H) 65 - 99 mg/dL    Bun 21 8 - 22 mg/dL    Creatinine 0.53 0.50 - 1.40 mg/dL    Calcium 8.8 8.5 - 10.5 mg/dL    AST(SGOT) 12 12 - 45 U/L    ALT(SGPT) 11 2 - 50 U/L    Alkaline Phosphatase 73 30 - 99 U/L    Total Bilirubin 0.3 0.1 - 1.5 mg/dL    Albumin 3.4 3.2 - 4.9  g/dL    Total Protein 6.2 6.0 - 8.2 g/dL    Globulin 2.8 1.9 - 3.5 g/dL    A-G Ratio 1.2 g/dL   PROTHROMBIN TIME   Result Value Ref Range    PT 15.5 (H) 12.0 - 14.6 sec    INR 1.21 (H) 0.87 - 1.13   APTT   Result Value Ref Range    APTT 28.6 24.7 - 36.0 sec   COD (ADULT)   Result Value Ref Range    ABO Grouping Only O     Rh Grouping Only POS     Antibody Screen-Cod NEG    TROPONIN   Result Value Ref Range    Troponin I 0.01 0.00 - 0.04 ng/mL   URINALYSIS CULTURE, IF INDICATED   Result Value Ref Range    Micro Urine Req Microscopic     Color Yellow     Character Clear     Specific Gravity 1.010 <1.035    Ph 6.5 5.0 - 8.0    Glucose Negative Negative mg/dL    Ketones Negative Negative mg/dL    Protein Negative Negative mg/dL    Bilirubin Negative Negative    Urobilinogen, Urine 0.2 Negative    Nitrite Negative Negative    Leukocyte Esterase Negative Negative    Occult Blood Trace (A) Negative   ESTIMATED GFR   Result Value Ref Range    GFR If African American >60 >60 mL/min/1.73 m 2    GFR If Non African American >60 >60 mL/min/1.73 m 2   URINE MICROSCOPIC (W/UA)   Result Value Ref Range    WBC 2-5 /hpf    RBC 2-5 (A) /hpf    Bacteria Many (A) None /hpf    Epithelial Cells Rare /hpf   ACCU-CHEK GLUCOSE   Result Value Ref Range    Glucose - Accu-Ck 96 65 - 99 mg/dL   EKG (NOW)   Result Value Ref Range    Report       [facility].    Test Date:  2018  Pt Name:    RONN JETT                 Department: ER  MRN:        [id number]                      Room:       [facility]  Gender:     Female                       Technician: [id number]  :        1940                   Requested By:RISHI ARRIAZA  Order #:    [id number]                    Moni MD:    Measurements  Intervals                                Axis  Rate:       75                           P:          28  HI:         188                          QRS:        -22  QRSD:       96                           T:           19  QT:         420  QTc:        470    Interpretive Statements  SINUS RHYTHM  VENTRICULAR PREMATURE COMPLEX  BORDERLINE LEFT AXIS DEVIATION  CONSIDER ANTERIOR INFARCT  No previous ECG available for comparison         RADIOLOGY  DX-CHEST-PORTABLE (1 VIEW)   Final Result         1.  No acute cardiopulmonary disease.   2.  Atherosclerosis      CT-CTA NECK WITH & W/O-POST PROCESSING   Final Result         1.  Atherosclerotic calcification of the left common carotid artery and bilateral carotid bifurcation with less than 50% stenosis   2.  Hypoplastic left vertebral artery   3.  Otherwise unremarkable CT angiogram of the neck   4.  Right thyroid low density lesion, appearance suggests small cyst or hypodense nodule. Could be further evaluated with thyroid sonography.      CT-CTA HEAD WITH & W/O-POST PROCESS   Final Result         1.  Atherosclerotic intracranial calcification of the internal carotid arteries with less than 50% stenosis   2.  Hypoplastic distal left vertebral artery   3.  Otherwise CT angiogram of the [place] within normal limits.      OUTSIDE IMAGES-CT HEAD   Final Result      CT-CEREBRAL PERFUSION ANALYSIS   Final Result         1.  Cerebral blood flow less than 30% likely representing completed infarct = 0 mL.      2.  T Max more than 6 seconds likely representing combination of completed infarct and ischemia = 0 mL.      3.  Mismatched volume likely representing ischemic brain/pneumbra = 0 mL.      4.  Please note that the cerebral perfusion was performed on the limited brain tissue around the basal ganglia region. Infarct/ischemia outside the CT perfusion sections can be missed in this study.      EC-ECHOCARDIOGRAM COMPLETE W/O CONT    (Results Pending)   MR-BRAIN-W/O    (Results Pending)   Critical Care Note  Upon my evaluation, this patient had high probability of imminent and life-threatening deterioration due to ischemic stroke, which required my direct attention, intervention, and  personal management. I personally provided 35 minutes of critical care time exclusive of time spent on separately billable procedures. Time includes review of laboratory data, radiology results, discussion with consultants, and monitoring for potential decompensation.       Reevaluation   Time:10:02 PM  Vital signs: Reviewed per nursing note  Assessment: Improving, smiling, conversant      COURSE & MEDICAL DECISION MAKING  Pertinent Labs & Imaging studies reviewed. (See chart for details)  Patient has a finding suggestive of improving ischemic CVA, most likely in the left hemisphere.  Patient had no convincing CT evidence here to suggest the need for interventional radiology and is notable that her perfusion scan was normal.  She had no other evidence to suggest a more likely etiology to explain her symptoms prior to arrival and will be admitted to the intensive care unit under the care of the hospitalist service    FINAL IMPRESSION  1. Acute CVA (cerebrovascular accident) (HCC)        Electronically signed by: Reynaldo Caraballo, 11/28/2018 8:26 PM

## 2018-11-29 NOTE — ASSESSMENT & PLAN NOTE
Status post TPA    Continue close clinical monitoring with serial neurologic exams  Continue atorvastatin  Follow-up on lipid panel  Start aspirin 24 hours post TPA  PT/OT/SLP  Consult neurology discussed with Dr. CARRILLO who will see her  Follow-up on MRI

## 2018-11-29 NOTE — ED TRIAGE NOTES
"Chief Complaint   Patient presents with   • Possible Stroke     tpa given, right sided weakness, left sided facial droop.      /95   Pulse 77   Temp 36.5 °C (97.7 °F) (Temporal)   Resp 12   Ht 1.676 m (5' 6\")   Wt 77.1 kg (170 lb)   SpO2 100%   BMI 27.44 kg/m²     BIB EMS, transfer from [place], PT was eating dinner with  when she experienced right sided weakness, left sided facial droop and slurred speech. Last known normal of 1630. Taken to outlying ED where she received TPA, bolus at 1825, as well as started on a nitro gtt for htn. Transferred to [facility] for higher level of care. Once at [facility] pt was taken to ct.    "

## 2018-11-29 NOTE — THERAPY
OT orders received. Pt on bedrest for alteplase. Will attempt as appropriate and able.    Jesica Lai, OTR/L  Pager: [phone]

## 2018-11-29 NOTE — CONSULTS
Critical Care Consultation    Date of consult: 11/29/2018    Referring Physician  No att. providers found    Reason for Consultation  Post alteplase    History of Presenting Illness  77 y.o. female who was brought to ED by daughter after developed right sided weakness, right facial droop while eating dinner, last seen normal at 1630. Pt received tPA at 1825. CT head was done at OSH and was unremarkable.  At ED arrival, pt's BP was 140/90s, HR 70s, CTA head and neck showed <50% stenosis of atherosclerotic intracranial calcification. Post tpa pt's right sided weakness seems to significantly improve. Pt was transferred to ICU post alteplase infusion.     Of note, pt is a current smoker 0.5 ppd >40 years. Drinks occasionally 2 liquor drinks/month. No known hx of CAD, diabetes, or TIA.     At ICU arrival, pt alert, oriented, hemodynamically stable. /80s, HR in 80s. Denies any chest pain, SOB, abd pain, +back pain. No headache. Oriented x2 to place and time    Code Status  Full Code    Review of Systems  Review of Systems   Constitutional: Negative for fatigue and fever.   Respiratory: Negative for shortness of breath and wheezing.    Cardiovascular: Negative for chest pain, palpitations and leg swelling.   Gastrointestinal: Negative for abdominal pain, diarrhea, nausea and vomiting.   Genitourinary: Negative for dysuria, flank pain and frequency.   Musculoskeletal: Positive for back pain.   Neurological: Positive for facial asymmetry. Negative for seizures, speech difficulty, light-headedness, numbness and headaches.       Past Medical History   has no past medical history on file.    Surgical History   has no past surgical history on file.    Family History  family history is not on file.    Social History       Medications  Home Medications     Reviewed by Radhika Bolden (Pharmacy Tech) on 11/28/18 at 2316  Med List Status: Partial   Medication Last Dose Status   Celecoxib (CELEBREX PO) 11/28/2018 Active    LOVASTATIN PO 11/28/2018 Active   PREDNISONE PO 11/28/2018 Active              Current Facility-Administered Medications   Medication Dose Route Frequency Provider Last Rate Last Dose   • senna-docusate (PERICOLACE or SENOKOT S) 8.6-50 MG per tablet 2 Tab  2 Tab Oral BID Du Ruffin M.D.        And   • polyethylene glycol/lytes (MIRALAX) PACKET 1 Packet  1 Packet Oral QDAY PRN Du Ruffin M.D.        And   • magnesium hydroxide (MILK OF MAGNESIA) suspension 30 mL  30 mL Oral QDAY PRN Du Ruffin M.D.        And   • bisacodyl (DULCOLAX) suppository 10 mg  10 mg Rectal QDAY PRN Du Ruffin M.D.       • Respiratory Care per Protocol   Nebulization Continuous RT Du Ruffin M.D.       • NS infusion   Intravenous Continuous Du Ruffin M.D. 50 mL/hr at 11/29/18 0043     • atorvastatin (LIPITOR) tablet 40 mg  40 mg Oral Q EVENING Du Ruffin M.D.       • hydrALAZINE (APRESOLINE) injection 10 mg  10 mg Intravenous Q2HRS PRN Du Ruffin M.D.       • [START ON 11/30/2018] aspirin (ASA) tablet 325 mg  325 mg Oral DAILY Du Ruffin M.D.        Or   • [START ON 11/30/2018] aspirin (ASA) chewable tab 324 mg  324 mg Oral DAILY Du Ruffin M.D.        Or   • [START ON 11/30/2018] aspirin (ASA) suppository 300 mg  300 mg Rectal DAILY Du Ruffin M.D.       • ondansetron (ZOFRAN) syringe/vial injection 4 mg  4 mg Intravenous Q4HRS PRN Du Ruffin M.D.       • ondansetron (ZOFRAN ODT) dispertab 4 mg  4 mg Oral Q4HRS PRN Du Ruffin M.D.       • nitroglycerin 50 mg in D5W 250 ml infusion  0-200 mcg/min Intravenous Continuous Reynaldo Caraballo M.D.   Stopped at 11/28/18 2204       Allergies  No Known Allergies    Vital Signs last 24 hours  Temp:  [36.5 °C (97.7 °F)] 36.5 °C (97.7 °F)  Pulse:  [74-85] 75  Resp:  [12-20] 16  BP: (145)/(95) 145/95    Physical Exam  Physical Exam   Constitutional: She appears well-nourished. No distress.   HENT:   Head: Normocephalic and atraumatic.   Neck: Neck supple.   Cardiovascular:  Normal rate, regular rhythm and normal heart sounds.    No murmur heard.  Pulmonary/Chest: Effort normal and breath sounds normal. No respiratory distress. She has no wheezes. She has no rales.   Abdominal: Soft. Bowel sounds are normal. She exhibits no distension. There is no tenderness. There is no rebound and no guarding.   Neurological: She is alert. No cranial nerve deficit. She exhibits normal muscle tone. Coordination normal.   + slight right facial droop  CN II, III, IV, VI, V, IX, X, XI, XII all intact  No cerebellar dysfunction with finger to nose test    Skin: Skin is warm. No rash noted. No erythema.   Psychiatric: She has a normal mood and affect.   Nursing note and vitals reviewed.      Fluids  No intake or output data in the 24 hours ending 11/29/18 0123    Laboratory  Recent Results (from the past 48 hour(s))   CBC WITH DIFFERENTIAL    Collection Time: 11/28/18  8:07 PM   Result Value Ref Range    WBC 11.5 (H) 4.8 - 10.8 K/uL    RBC 4.79 4.20 - 5.40 M/uL    Hemoglobin 12.6 12.0 - 16.0 g/dL    Hematocrit 40.6 37.0 - 47.0 %    MCV 84.8 81.4 - 97.8 fL    MCH 26.3 (L) 27.0 - 33.0 pg    MCHC 31.0 (L) 33.6 - 35.0 g/dL    RDW 46.2 35.9 - 50.0 fL    Platelet Count 241 164 - 446 K/uL    MPV 9.3 9.0 - 12.9 fL    Neutrophils-Polys 66.70 44.00 - 72.00 %    Lymphocytes 23.50 22.00 - 41.00 %    Monocytes 7.90 0.00 - 13.40 %    Eosinophils 1.10 0.00 - 6.90 %    Basophils 0.30 0.00 - 1.80 %    Immature Granulocytes 0.50 0.00 - 0.90 %    Nucleated RBC 0.00 /100 WBC    Neutrophils (Absolute) 7.64 (H) 2.00 - 7.15 K/uL    Lymphs (Absolute) 2.70 1.00 - 4.80 K/uL    Monos (Absolute) 0.91 (H) 0.00 - 0.85 K/uL    Eos (Absolute) 0.13 0.00 - 0.51 K/uL    Baso (Absolute) 0.04 0.00 - 0.12 K/uL    Immature Granulocytes (abs) 0.06 0.00 - 0.11 K/uL    NRBC (Absolute) 0.00 K/uL   COMP METABOLIC PANEL    Collection Time: 11/28/18  8:07 PM   Result Value Ref Range    Sodium 142 135 - 145 mmol/L    Potassium 3.8 3.6 - 5.5 mmol/L     Chloride 107 96 - 112 mmol/L    Co2 28 20 - 33 mmol/L    Anion Gap 7.0 0.0 - 11.9    Glucose 104 (H) 65 - 99 mg/dL    Bun 21 8 - 22 mg/dL    Creatinine 0.53 0.50 - 1.40 mg/dL    Calcium 8.8 8.5 - 10.5 mg/dL    AST(SGOT) 12 12 - 45 U/L    ALT(SGPT) 11 2 - 50 U/L    Alkaline Phosphatase 73 30 - 99 U/L    Total Bilirubin 0.3 0.1 - 1.5 mg/dL    Albumin 3.4 3.2 - 4.9 g/dL    Total Protein 6.2 6.0 - 8.2 g/dL    Globulin 2.8 1.9 - 3.5 g/dL    A-G Ratio 1.2 g/dL   PROTHROMBIN TIME    Collection Time: 18  8:07 PM   Result Value Ref Range    PT 15.5 (H) 12.0 - 14.6 sec    INR 1.21 (H) 0.87 - 1.13   APTT    Collection Time: 18  8:07 PM   Result Value Ref Range    APTT 28.6 24.7 - 36.0 sec   COD (ADULT)    Collection Time: 18  8:07 PM   Result Value Ref Range    ABO Grouping Only O     Rh Grouping Only POS     Antibody Screen-Cod NEG    TROPONIN    Collection Time: 18  8:07 PM   Result Value Ref Range    Troponin I 0.01 0.00 - 0.04 ng/mL   ESTIMATED GFR    Collection Time: 18  8:07 PM   Result Value Ref Range    GFR If African American >60 >60 mL/min/1.73 m 2    GFR If Non African American >60 >60 mL/min/1.73 m 2   ACCU-CHEK GLUCOSE    Collection Time: 18  8:07 PM   Result Value Ref Range    Glucose - Accu-Ck 96 65 - 99 mg/dL   EKG (NOW)    Collection Time: 18  8:50 PM   Result Value Ref Range    Report       [facility] Emergency Dept.    Test Date:  2018  Pt Name:    RONN JETT                 Department: ER  MRN:        [id number]                      Room:       [facility]  Gender:     Female                       Technician: [id number]  :        1940                   Requested By:RISHI ARRIAZA  Order #:    [id number]                    Moni MD:    Measurements  Intervals                                Axis  Rate:       75                           P:          28  MD:         188                          QRS:        -22  QRSD:       96                            T:          19  QT:         420  QTc:        470    Interpretive Statements  SINUS RHYTHM  VENTRICULAR PREMATURE COMPLEX  BORDERLINE LEFT AXIS DEVIATION  CONSIDER ANTERIOR INFARCT  No previous ECG available for comparison     URINALYSIS CULTURE, IF INDICATED    Collection Time: 11/28/18  9:32 PM   Result Value Ref Range    Micro Urine Req Microscopic     Color Yellow     Character Clear     Specific Gravity 1.010 <1.035    Ph 6.5 5.0 - 8.0    Glucose Negative Negative mg/dL    Ketones Negative Negative mg/dL    Protein Negative Negative mg/dL    Bilirubin Negative Negative    Urobilinogen, Urine 0.2 Negative    Nitrite Negative Negative    Leukocyte Esterase Negative Negative    Occult Blood Trace (A) Negative   URINE MICROSCOPIC (W/UA)    Collection Time: 11/28/18  9:32 PM   Result Value Ref Range    WBC 2-5 /hpf    RBC 2-5 (A) /hpf    Bacteria Many (A) None /hpf    Epithelial Cells Rare /hpf       Imaging  DX-CHEST-PORTABLE (1 VIEW)   Final Result         1.  No acute cardiopulmonary disease.   2.  Atherosclerosis      CT-CTA NECK WITH & W/O-POST PROCESSING   Final Result         1.  Atherosclerotic calcification of the left common carotid artery and bilateral carotid bifurcation with less than 50% stenosis   2.  Hypoplastic left vertebral artery   3.  Otherwise unremarkable CT angiogram of the neck   4.  Right thyroid low density lesion, appearance suggests small cyst or hypodense nodule. Could be further evaluated with thyroid sonography.      CT-CTA HEAD WITH & W/O-POST PROCESS   Final Result         1.  Atherosclerotic intracranial calcification of the internal carotid arteries with less than 50% stenosis   2.  Hypoplastic distal left vertebral artery   3.  Otherwise CT angiogram of the [place] within normal limits.      OUTSIDE IMAGES-CT HEAD   Final Result      CT-CEREBRAL PERFUSION ANALYSIS   Final Result         1.  Cerebral blood flow less than 30% likely representing completed infarct = 0  mL.      2.  T Max more than 6 seconds likely representing combination of completed infarct and ischemia = 0 mL.      3.  Mismatched volume likely representing ischemic brain/pneumbra = 0 mL.      4.  Please note that the cerebral perfusion was performed on the limited brain tissue around the basal ganglia region. Infarct/ischemia outside the CT perfusion sections can be missed in this study.      EC-ECHOCARDIOGRAM COMPLETE W/O CONT    (Results Pending)   MR-BRAIN-W/O    (Results Pending)       Assessment/Plan  CVA (cerebral vascular accident) ([facility])   Assessment & Plan    S/p alteplase with near resolution of her neurological symptoms  Watch for complications of alteplase that includes intracerebral hemorrhage, systemic bleeding.   - Frequent neuro check Q1H  - Goal to keep BP <180/105 in the first 24 hours.   - MRI brain   - Start aspirin after at least 24 hours of alteplase infusion and after MRI brain   - TTE  - US carotids  - Transfuse when Hgb <7         Discussed patient condition and risk of morbidity and/or mortality with RN and Patient.    Pt is at risk of bleeding after alteplase infusion. Need close monitoring of neuro check, labs and hemodynamics.     The patient remains critically ill.  Critical care time = 40 minutes in directly providing and coordinating critical care and extensive data review.  No time overlap and excludes procedures.    Tate Lopez, DO  Critical Care

## 2018-11-29 NOTE — THERAPY
PT consult received, Pt with active bedrest orders s/p Alteplase.  Will re attempt as able/appropriate.     Ana Oleary PT/DPT  Pager# [phone]

## 2018-11-29 NOTE — PROGRESS NOTES
This is in addition to Dr Lopez's note earlier today.    Pt post TPA, remains on q1 hour neuro checks, right sided weakness appears to be improving. Pt remains slightly confused to the situation, also complains of rt shoulder pain.     i will continue q1 hour neuro checks until 24hr post tpa  Start asa 24hr post tpa  High intensity statin  Echo, tele, mri brain  Lipid panel, tsh, hba1c  Pt/ot/speech evals    Additional critical care time, not including billable procedures 35 minutes

## 2018-11-29 NOTE — ASSESSMENT & PLAN NOTE
S/p alteplase with near resolution of her neurological symptoms  Near resolution of weakness/sensory changes  Post 24 hour window for tpa  Keep sbp < 140/90  MRI left thalamic infarct  On losartan  Asa /lovenox ok to start  Echo normal  Conservative transfusion goal, for < 7

## 2018-11-29 NOTE — PROGRESS NOTES
[facility] Hospitalist Progress Note    Date of Service: 2018    Chief Complaint  77 y.o. female admitted 2018 with stoke s/p TPA    Interval Problem Update  AOx4  Rt facial droop improved  -170  NPO pending SLP  Tmax 98.8  NS at 50   Rt shoulder xray  Asa        Consultants/Specialty  Neurology  Critical care    Disposition  To be determined        Review of Systems   Constitutional: Negative for fever and malaise/fatigue.   HENT: Negative for congestion, ear discharge and nosebleeds.    Eyes: Negative for blurred vision, pain, discharge and redness.   Respiratory: Negative for cough, hemoptysis, sputum production, shortness of breath and stridor.    Cardiovascular: Negative for chest pain, palpitations, orthopnea and leg swelling.   Gastrointestinal: Negative for abdominal pain, blood in stool, diarrhea, melena, nausea and vomiting.   Genitourinary: Negative for dysuria, flank pain and hematuria.   Musculoskeletal: Negative for back pain, falls, joint pain and neck pain.   Skin: Negative.    Neurological: Positive for weakness. Negative for speech change, focal weakness, seizures, loss of consciousness and headaches.   Psychiatric/Behavioral: Negative for hallucinations, memory loss and substance abuse. The patient is not nervous/anxious.    All other systems reviewed and are negative.     Physical Exam  Laboratory/Imaging   Hemodynamics  Temp (24hrs), Av.4 °C (97.6 °F), Min:36.4 °C (97.6 °F), Max:36.5 °C (97.7 °F)   Temperature: 36.4 °C (97.6 °F), Monitored Temp: 37.1 °C (98.8 °F)  Pulse  Av.5  Min: 71  Max: 85 Heart Rate (Monitored): 80  Blood Pressure : 145/95, NIBP: 127/76      Respiratory      Respiration: 16, Pulse Oximetry: 94 %, O2 Daily Delivery Respiratory : Silicone Nasal Cannula        RUL Breath Sounds: Clear, RML Breath Sounds: Clear, RLL Breath Sounds: Diminished, JOHNSON Breath Sounds: Clear, LLL Breath Sounds: Diminished    Fluids    Intake/Output Summary (Last 24 hours) at  11/29/18 0912  Last data filed at 11/29/18 0800   Gross per 24 hour   Intake           364.17 ml   Output              825 ml   Net          -460.83 ml       Nutrition  Orders Placed This Encounter   Procedures   • Diet NPO     Standing Status:   Standing     Number of Occurrences:   1     Order Specific Question:   Restrict to:     Answer:   Strict [1]     Physical Exam   Constitutional: She is oriented to person, place, and time. She appears well-developed and well-nourished.   HENT:   Head: Normocephalic and atraumatic.   Right Ear: External ear normal.   Left Ear: External ear normal.   Mouth/Throat: No oropharyngeal exudate.   Eyes: Conjunctivae are normal. Right eye exhibits no discharge. Left eye exhibits no discharge. No scleral icterus.   Neck: Neck supple. No JVD present. No tracheal deviation present.   Cardiovascular: Normal rate and regular rhythm.  Exam reveals no gallop and no friction rub.    No murmur heard.  Pulmonary/Chest: Effort normal and breath sounds normal. No stridor. No respiratory distress. She has no wheezes. She has no rales. She exhibits no tenderness.   Abdominal: Soft. Bowel sounds are normal. She exhibits no distension and no mass. There is no tenderness. There is no rebound and no guarding.   Musculoskeletal: She exhibits no edema or tenderness.   Neurological: She is alert and oriented to person, place, and time. A cranial nerve deficit is present. She exhibits abnormal muscle tone.   Mild right hemiparesis   Skin: Skin is warm and dry. She is not diaphoretic. No cyanosis or erythema. Nails show no clubbing.   Psychiatric: She has a normal mood and affect. Her behavior is normal.   Nursing note and vitals reviewed.      Recent Labs      11/28/18 2007   WBC  11.5*   RBC  4.79   HEMOGLOBIN  12.6   HEMATOCRIT  40.6   MCV  84.8   MCH  26.3*   MCHC  31.0*   RDW  46.2   PLATELETCT  241   MPV  9.3     Recent Labs      11/28/18 2007   SODIUM  142   POTASSIUM  3.8   CHLORIDE  107   CO2   28   GLUCOSE  104*   BUN  21   CREATININE  0.53   CALCIUM  8.8     Recent Labs      11/28/18 2007   APTT  28.6   INR  1.21*                  Assessment/Plan     CVA (cerebral vascular accident) (HCC)- (present on admission)   Assessment & Plan    Status post TPA    Continue close clinical monitoring with serial neurologic exams  Continue atorvastatin  Follow-up on lipid panel  Start aspirin 24 hours post TPA  PT/OT/SLP  Consult neurology discussed with Dr. CARRILLO who will see her  Follow-up on MRI       Thyroid nodule   Assessment & Plan    Check TSH     Rheumatoid arthritis (HCC)   Assessment & Plan    Resume home dose of prednisone and Plaquenil     Essential hypertension   Assessment & Plan    Allow for permissive hypertension  PRN hydralazine for SBP greater than 180 or DBP greater than 105     Tobacco use   Assessment & Plan    Patient counseled on smoking cessation     Leukocytosis- (present on admission)   Assessment & Plan    Likely reactive  No evidence of infection at this time  Monitor CBC and vitals     HLD (hyperlipidemia)- (present on admission)   Assessment & Plan    Started on atorvastatin 40 mg daily       Quality-Core Measures   Reviewed items::  Labs reviewed, Medications reviewed and Radiology images reviewed  Silva catheter::  Critically Ill - Requiring Accurate Measurement of Urinary Output  DVT prophylaxis - mechanical:  SCDs  Assessed for rehabilitation services:  Patient was assess for and/or received rehabilitation services during this hospitalization

## 2018-11-29 NOTE — CARE PLAN
Problem: Safety  Goal: Will remain free from falls  Outcome: PROGRESSING AS EXPECTED  Risk for falls assessed, bed alarm on, bed locked and in lowest position, pt room near nurses station, pt rounded on q1hr, call light in place. Pt and family educated on fall risk precautions and need to call RN before getting up.      Problem: Venous Thromboembolism (VTW)/Deep Vein Thrombosis (DVT) Prevention:  Goal: Patient will participate in Venous Thrombosis (VTE)/Deep Vein Thrombosis (DVT)Prevention Measures  Outcome: PROGRESSING AS EXPECTED  Pt assessed for any s/s of DVT, SCDs in place and turned on, ROM performed.

## 2018-11-29 NOTE — CONSULTS
"Chief Complaint   Patient presents with   • Possible Stroke     tpa given, right sided weakness, left sided facial droop.        Problem List Items Addressed This Visit     None      Visit Diagnoses     Acute CVA (cerebrovascular accident) (HCC)        Relevant Medications    nitroglycerin 50 mg in D5W 250 ml infusion    atorvastatin (LIPITOR) tablet 40 mg (Start on 11/29/2018  6:00 PM)    hydrALAZINE (APRESOLINE) injection 10 mg    enoxaparin (LOVENOX) inj 40 mg (Start on 11/29/2018  6:30 PM)    losartan (COZAAR) 50 MG Tab          History of present illness:  This is a 77-year old female with PMHx significant for dyslipidemia, hypertension, tobacco abuse (1/2 ppd) and remote history of stroke with mild residual Right sided deficits at baseline who presented to [facility] on 11/28/18 for a chief complaint of worsening Right sided weakness and right facial weakness per daughter. The patient is a poor historian, further details of HPI obtained via review of patient's medical record.   The patient was apparently last seen in her usual state of health around 1630 on 11/28; she was apparently driving to the store when she suddenly became \"dizzy.\" Patient admits that her \"vision felt off,\" however has cannot further describe or characterize this sensation. Patient's daughter (whom patient was with at the time) apparently noted worsening Right sided weakness and Right facial droop as well, thus she was brought to an OSH for further evaluation. CT Brain at that time revealed no acute intracranial abnormality; CTA without acute thrombus or LVO; patient's exclusion criteria was reviewed and patient was determined to be a candidate for IV tPA. Patient received IV tPA bolus at approximately 1845 on 11/28/18.   Currently, patient is sitting up in bed; briskly arousable to voice. States that she feels \"almost back to normal.\" Denies headache or dizziness. Denies weakness, numbness or tingling to any part of the body; " admits to mild RUE/RLE to which she admits is baseline. Denies problems with vision (double vision, blurred vision, or loss of vision), speech or swallowing. Denies recent falls or recent trauma/head trauma.      Neurology has been consulted by Dr. Du Ruffin to further evaluate the findings noted above.     Past medical history:   As noted above.     Past surgical history:   Non contributory.     Family history:   Non contributory.     Social history:   Social History     Social History   • Marital status:      Spouse name: N/A   • Number of children: N/A   • Years of education: N/A     Occupational History   • Not on file.     Social History Main Topics   • Smoking status: Not on file   • Smokeless tobacco: Not on file   • Alcohol use Not on file   • Drug use: Unknown   • Sexual activity: Not on file     Other Topics Concern   • Not on file     Social History Narrative   • No narrative on file       Current medications:   Current Facility-Administered Medications   Medication Dose   • senna-docusate (PERICOLACE or SENOKOT S) 8.6-50 MG per tablet 2 Tab  2 Tab    And   • polyethylene glycol/lytes (MIRALAX) PACKET 1 Packet  1 Packet    And   • magnesium hydroxide (MILK OF MAGNESIA) suspension 30 mL  30 mL    And   • bisacodyl (DULCOLAX) suppository 10 mg  10 mg   • Respiratory Care per Protocol     • NS infusion     • atorvastatin (LIPITOR) tablet 40 mg  40 mg   • hydrALAZINE (APRESOLINE) injection 10 mg  10 mg   • ondansetron (ZOFRAN) syringe/vial injection 4 mg  4 mg   • ondansetron (ZOFRAN ODT) dispertab 4 mg  4 mg   • aspirin (ASA) tablet 325 mg  325 mg    Or   • aspirin (ASA) chewable tab 324 mg  324 mg    Or   • aspirin (ASA) suppository 300 mg  300 mg   • enoxaparin (LOVENOX) inj 40 mg  40 mg   • hydroxychloroquine (PLAQUENIL) tablet 400 mg  400 mg   • predniSONE (DELTASONE) tablet 20 mg  20 mg   • omeprazole (PRILOSEC) capsule 20 mg  20 mg   • nitroglycerin 50 mg in D5W 250 ml infusion  0-200 mcg/min        Medication Allergy:  No Known Allergies    Review of systems:   Constitutional: denies fever, night sweats, weight loss.   Eyes: denies acute vision change, eye pain or secretion.   Ears, Nose, Mouth, Throat: denies nasal secretion, nasal bleeding, difficulty swallowing, hearing loss, tinnitus, vertigo, ear pain, acute dental problems, oral ulcers or lesions.   Endocrine: denies recent weight changes, heat or cold intolerance, polyuria, polydypsia, polyphagia,abnormal hair growth.  Cardiovascular: denies new onset of chest pain, palpitations, syncope, or dyspnea of exertion.  Pulmonary: denies shortness of breath, new onset of cough, hemoptysis, wheezing, chest pain or flu-like symptoms.   GI: denies nausea, vomiting, diarrhea, GI bleeding, change in appetite, abdominal pain, and change in bowel habits.  : denies dysuria, urinary incontinence, hematuria.  Heme/oncology: denies history of easy bruising or bleeding. No history of cancer, DVTor PE.  Allergy/immunology: denies hives/urticaria, or itching.   Dermatologic: denies new rash, or new skin lesions.  Musculoskeletal:denies joint swelling or pain, muscle pain, neck and back pain.   Neurologic: As noted above.   Psychiatric: denies symptoms of depression, suicidal or homicidal thoughts.      Physical examination:   Vitals:    11/29/18 1300 11/29/18 1400 11/29/18 1500 11/29/18 1600   BP:       Pulse: 79 77 87 82   Resp: 18 20 16 17   Temp:       TempSrc: Silva Silva Silva Silva   SpO2: 94% 94% 96% 93%   Weight:       Height:         General: Patient in no acute distress, pleasant and cooperative.  HEENT: Normocephalic, no signs of acute trauma.   Neck: supple, no meningeal signs or carotid bruits. There is normal range of motion. No tenderness on exam.   Chest: clear to auscultation. No cough.   CV: RRR, no murmurs.   Skin: no signs of acute rashes or trauma.   Musculoskeletal: joints exhibit full range of motion, without any pain to palpation. There are  no signs of joint or muscle swelling. There is no tenderness to deep palpation of muscles.   Psychiatric: No hallucinatory behavior. Denies symptoms of depression or suicidal ideation. Mood and affect appear normal on exam.     NEUROLOGICAL EXAM:   Mental status, orientation: Awake, alert and fully oriented.   Speech and language: speech is clear and fluent, non-dysarthric. The patient is able to name, repeat and comprehend.   Memory: There is intact recollection of recent and remote events.   Cranial nerve exam: Pupils are 3-4 mm bilaterally and equally reactive to light and accommodation. Visual fields are intact by confrontation. There is no nystagmus on primary or secondary gaze. Intact full EOM in all directions of gaze. Face appears symmetric. Sensation in the face is intact to light touch. Tongue is midline and without any signs of tongue biting or fasciculations. Sternocleidomastoid muscles exhibit is normal strength bilaterally. Shoulder shrug is intact bilaterally.   Motor exam: Strength is 5/5 in LUE/LLE. Strength 4+/5 to RUE/RLE with very mild drift to RUE only. Tone is normal. No abnormal movements were seen on exam.   Sensory exam reveals normal sense of light touch and pinprick in all extremities.   Deep tendon reflexes:  2+ throughout. Plantar responses are flexor. There is no clonus.   Coordination: shows a normal finger-nose-finger. Normal rapidly alternating movements.   Gait: Not assessed at this time.     NIH Stroke Scale    1a Level of Consciousness   1b Orientation Questions   1c Response to Commands   2 Gaze   3 Visual Fields   4 Facial Movement   5 Motor Function (arm)   a Left   b Right 1  6 Motor Function (leg)   a Left   b Right   7 Limb Ataxia   8 Sensory   9 Language   10 Articulation   11 Extinction/Inattention     Score: 1    ANCILLARY DATA REVIEWED:     Lab Data Review:  Recent Results (from the past 24 hour(s))   CBC WITH DIFFERENTIAL    Collection Time: 11/28/18  8:07 PM   Result  Value Ref Range    WBC 11.5 (H) 4.8 - 10.8 K/uL    RBC 4.79 4.20 - 5.40 M/uL    Hemoglobin 12.6 12.0 - 16.0 g/dL    Hematocrit 40.6 37.0 - 47.0 %    MCV 84.8 81.4 - 97.8 fL    MCH 26.3 (L) 27.0 - 33.0 pg    MCHC 31.0 (L) 33.6 - 35.0 g/dL    RDW 46.2 35.9 - 50.0 fL    Platelet Count 241 164 - 446 K/uL    MPV 9.3 9.0 - 12.9 fL    Neutrophils-Polys 66.70 44.00 - 72.00 %    Lymphocytes 23.50 22.00 - 41.00 %    Monocytes 7.90 0.00 - 13.40 %    Eosinophils 1.10 0.00 - 6.90 %    Basophils 0.30 0.00 - 1.80 %    Immature Granulocytes 0.50 0.00 - 0.90 %    Nucleated RBC 0.00 /100 WBC    Neutrophils (Absolute) 7.64 (H) 2.00 - 7.15 K/uL    Lymphs (Absolute) 2.70 1.00 - 4.80 K/uL    Monos (Absolute) 0.91 (H) 0.00 - 0.85 K/uL    Eos (Absolute) 0.13 0.00 - 0.51 K/uL    Baso (Absolute) 0.04 0.00 - 0.12 K/uL    Immature Granulocytes (abs) 0.06 0.00 - 0.11 K/uL    NRBC (Absolute) 0.00 K/uL   COMP METABOLIC PANEL    Collection Time: 11/28/18  8:07 PM   Result Value Ref Range    Sodium 142 135 - 145 mmol/L    Potassium 3.8 3.6 - 5.5 mmol/L    Chloride 107 96 - 112 mmol/L    Co2 28 20 - 33 mmol/L    Anion Gap 7.0 0.0 - 11.9    Glucose 104 (H) 65 - 99 mg/dL    Bun 21 8 - 22 mg/dL    Creatinine 0.53 0.50 - 1.40 mg/dL    Calcium 8.8 8.5 - 10.5 mg/dL    AST(SGOT) 12 12 - 45 U/L    ALT(SGPT) 11 2 - 50 U/L    Alkaline Phosphatase 73 30 - 99 U/L    Total Bilirubin 0.3 0.1 - 1.5 mg/dL    Albumin 3.4 3.2 - 4.9 g/dL    Total Protein 6.2 6.0 - 8.2 g/dL    Globulin 2.8 1.9 - 3.5 g/dL    A-G Ratio 1.2 g/dL   PROTHROMBIN TIME    Collection Time: 11/28/18  8:07 PM   Result Value Ref Range    PT 15.5 (H) 12.0 - 14.6 sec    INR 1.21 (H) 0.87 - 1.13   APTT    Collection Time: 11/28/18  8:07 PM   Result Value Ref Range    APTT 28.6 24.7 - 36.0 sec   COD (ADULT)    Collection Time: 11/28/18  8:07 PM   Result Value Ref Range    ABO Grouping Only O     Rh Grouping Only POS     Antibody Screen-Cod NEG    TROPONIN    Collection Time: 11/28/18  8:07 PM   Result  Value Ref Range    Troponin I 0.01 0.00 - 0.04 ng/mL   ESTIMATED GFR    Collection Time: 18  8:07 PM   Result Value Ref Range    GFR If African American >60 >60 mL/min/1.73 m 2    GFR If Non African American >60 >60 mL/min/1.73 m 2   ACCU-CHEK GLUCOSE    Collection Time: 18  8:07 PM   Result Value Ref Range    Glucose - Accu-Ck 96 65 - 99 mg/dL   EKG (NOW)    Collection Time: 18  8:50 PM   Result Value Ref Range    Report       [facility] Emergency Dept.    Test Date:  2018  Pt Name:    RONN JETT                 Department: ER  MRN:        [id number]                      Room:       [facility]  Gender:     Female                       Technician: [id number]  :        [id number]                   Requested By:RISHI ARRIAZA  Order #:    [id number]                    Reading MD:    Measurements  Intervals                                Axis  Rate:       75                           P:          28  MI:         188                          QRS:        -22  QRSD:       96                           T:          19  QT:         420  QTc:        470    Interpretive Statements  SINUS RHYTHM  VENTRICULAR PREMATURE COMPLEX  BORDERLINE LEFT AXIS DEVIATION  CONSIDER ANTERIOR INFARCT  No previous ECG available for comparison     URINALYSIS CULTURE, IF INDICATED    Collection Time: 18  9:32 PM   Result Value Ref Range    Micro Urine Req Microscopic     Color Yellow     Character Clear     Specific Gravity 1.010 <1.035    Ph 6.5 5.0 - 8.0    Glucose Negative Negative mg/dL    Ketones Negative Negative mg/dL    Protein Negative Negative mg/dL    Bilirubin Negative Negative    Urobilinogen, Urine 0.2 Negative    Nitrite Negative Negative    Leukocyte Esterase Negative Negative    Occult Blood Trace (A) Negative   URINE MICROSCOPIC (W/UA)    Collection Time: 18  9:32 PM   Result Value Ref Range    WBC 2-5 /hpf    RBC 2-5 (A) /hpf    Bacteria Many (A) None /hpf    Epithelial Cells Rare  /hpf   ABO AND RH CONFIRMATION    Collection Time: 11/29/18  2:05 AM   Result Value Ref Range    ABO Confirm O     Second Rh Group POS        Labs reviewed by me.       Imaging reviewed by me:     EC-ECHOCARDIOGRAM COMPLETE W/O CONT         MR-BRAIN-W/O   Final Result         1. Age-related cerebral atrophy.      2. Mild periventricular white matter changes consistent with chronic microvascular ischemic gliosis.      3. Acute left thalamic infarct.      DX-SHOULDER 2+ RIGHT   Final Result      1.  No acute osseous abnormality.   2.  Moderate to severe acromioclavicular and glenohumeral arthrosis.      DX-CHEST-PORTABLE (1 VIEW)   Final Result         1.  No acute cardiopulmonary disease.   2.  Atherosclerosis      CT-CTA NECK WITH & W/O-POST PROCESSING   Final Result         1.  Atherosclerotic calcification of the left common carotid artery and bilateral carotid bifurcation with less than 50% stenosis   2.  Hypoplastic left vertebral artery   3.  Otherwise unremarkable CT angiogram of the neck   4.  Right thyroid low density lesion, appearance suggests small cyst or hypodense nodule. Could be further evaluated with thyroid sonography.      CT-CTA HEAD WITH & W/O-POST PROCESS   Final Result         1.  Atherosclerotic intracranial calcification of the internal carotid arteries with less than 50% stenosis   2.  Hypoplastic distal left vertebral artery   3.  Otherwise CT angiogram of the [place] within normal limits.      OUTSIDE IMAGES-CT HEAD   Final Result      CT-CEREBRAL PERFUSION ANALYSIS   Final Result         1.  Cerebral blood flow less than 30% likely representing completed infarct = 0 mL.      2.  T Max more than 6 seconds likely representing combination of completed infarct and ischemia = 0 mL.      3.  Mismatched volume likely representing ischemic brain/pneumbra = 0 mL.      4.  Please note that the cerebral perfusion was performed on the limited brain tissue around the basal ganglia region.  Infarct/ischemia outside the CT perfusion sections can be missed in this study.        CT: No acute intracranial abnormality.   CTA Brain/neck: No hemodynamically significant (>50%) stenosis.       Presumed mechanism by TOAST:  __Large Artery Atherosclerosis  _X_Small Vessel (Lacunar)  __Cardioembolic  __Other (Sickle Cell, Vasculitis, Hypercoagulable)  __Unknown    Vs. Left PCA territory.     ASSESSMENT AND PLAN:  77-year old female with dyslipidemia, hypertension, tobacco abuse (1/2 ppd) and remote history of stroke with mild residual Right sided deficits at baseline who presented to [facility] on 11/28/18 for a chief complaint of worsening Right sided weakness and right facial weakness, also with non-specific visual deficit. Patient received IV tPA at 1845 on 11/28/18; NIHSS currently 1, significant only for slight RUE weakness/drift. Note patient's MRI Brain has revealed small acute ischemic infarction involving Left thalamus, small vessel/lacunar etiology vs. Left PCA territory.     Impression:   Acute Left thalamic infarction; lacunar etiology vs. Left PCA territory.   History of multiple vascular risk factors for stroke, including dyslipidemia, hypertension, and tobacco abuse.    Remote history of ischemic stroke.     Recommendations/Plan:    -q4h and PRN neuro assessment. VS per nursing/unit protocol. SBP < 180.   -Telemetry; Screen for Afib. If no Afib identified during admission, patient may be appropriate for outpatient Loop/holter monitoring to be arranged by PCP/Cardiology. TTE with bubble study completed, results pending.   -OK to initiate ASA, no hemorrhage per repeat imaging/MRI.   -Atorvastatin 40 mg PO q HS. Lipid panel pending.   -Recommend aggressive BG management per primary team. A1c pending.   -PT/OT/SLP eval and treat. Physiatry consult.    -Counseled patient at length regarding life style and risk factor modification (including importance of smoking cessation) for secondary stroke  prevention.   -All other medical management per primary team.   -DVT Prophylaxis: SCDs.     The plan of care above has been discussed with Dr. Gomes.     Shaye Armas MSN, BSN, AGNP-C  [facility]

## 2018-11-29 NOTE — PROGRESS NOTES
· 2 RN skin check complete.   · Devices in place: pulse ox, SCD's, silicone nasal cannula, blood pressure cuff.  · Skin assessed under devices: pulse ox, SCD's, silicone nasal cannula, blood pressure cuff.  · Confirmed pressure ulcers found on n/a.  · New potential pressure ulcers noted on n/a. Patient has a skin tear on her right arm from a previous fall and a scab on her right foot, second toe, oh which are both prior to arrival. Elbows are both red and blanching.  · The following interventions in place: Q2 turns using pillows and draw sheet, extremities resting on pillows, skin under SCD's, silicone nasal cannula, and blood pressure cuff assessed during assessments, pulse ox rotated.

## 2018-11-30 ENCOUNTER — HOSPITAL ENCOUNTER (INPATIENT)
Facility: REHABILITATION | Age: 78
LOS: 1 days | DRG: 057 | End: 2018-11-30
Attending: PHYSICAL MEDICINE & REHABILITATION | Admitting: PHYSICAL MEDICINE & REHABILITATION
Payer: COMMERCIAL

## 2018-11-30 VITALS
BODY MASS INDEX: 25.55 KG/M2 | SYSTOLIC BLOOD PRESSURE: 139 MMHG | WEIGHT: 168.6 LBS | TEMPERATURE: 98.6 F | RESPIRATION RATE: 18 BRPM | HEART RATE: 88 BPM | DIASTOLIC BLOOD PRESSURE: 85 MMHG | HEIGHT: 68 IN | OXYGEN SATURATION: 91 %

## 2018-11-30 VITALS
BODY MASS INDEX: 27.25 KG/M2 | RESPIRATION RATE: 18 BRPM | HEART RATE: 78 BPM | TEMPERATURE: 98.4 F | DIASTOLIC BLOOD PRESSURE: 95 MMHG | SYSTOLIC BLOOD PRESSURE: 145 MMHG | HEIGHT: 66 IN | OXYGEN SATURATION: 94 % | WEIGHT: 169.53 LBS

## 2018-11-30 PROBLEM — D72.829 LEUKOCYTOSIS: Status: RESOLVED | Noted: 2018-11-29 | Resolved: 2018-11-30

## 2018-11-30 PROBLEM — R13.10 DYSPHAGIA: Status: ACTIVE | Noted: 2018-11-30

## 2018-11-30 LAB
ANION GAP SERPL CALC-SCNC: 6 MMOL/L (ref 0–11.9)
BASOPHILS # BLD AUTO: 0.2 % (ref 0–1.8)
BASOPHILS # BLD: 0.02 K/UL (ref 0–0.12)
BNP SERPL-MCNC: 36 PG/ML (ref 0–100)
BUN SERPL-MCNC: 11 MG/DL (ref 8–22)
CALCIUM SERPL-MCNC: 8.7 MG/DL (ref 8.5–10.5)
CHLORIDE SERPL-SCNC: 108 MMOL/L (ref 96–112)
CO2 SERPL-SCNC: 27 MMOL/L (ref 20–33)
CREAT SERPL-MCNC: 0.45 MG/DL (ref 0.5–1.4)
EOSINOPHIL # BLD AUTO: 0.09 K/UL (ref 0–0.51)
EOSINOPHIL NFR BLD: 1 % (ref 0–6.9)
ERYTHROCYTE [DISTWIDTH] IN BLOOD BY AUTOMATED COUNT: 46.4 FL (ref 35.9–50)
GLUCOSE SERPL-MCNC: 102 MG/DL (ref 65–99)
HCT VFR BLD AUTO: 37.5 % (ref 37–47)
HGB BLD-MCNC: 11.8 G/DL (ref 12–16)
IMM GRANULOCYTES # BLD AUTO: 0.05 K/UL (ref 0–0.11)
IMM GRANULOCYTES NFR BLD AUTO: 0.5 % (ref 0–0.9)
LYMPHOCYTES # BLD AUTO: 2.35 K/UL (ref 1–4.8)
LYMPHOCYTES NFR BLD: 25.7 % (ref 22–41)
MCH RBC QN AUTO: 26.9 PG (ref 27–33)
MCHC RBC AUTO-ENTMCNC: 31.5 G/DL (ref 33.6–35)
MCV RBC AUTO: 85.6 FL (ref 81.4–97.8)
MONOCYTES # BLD AUTO: 0.7 K/UL (ref 0–0.85)
MONOCYTES NFR BLD AUTO: 7.6 % (ref 0–13.4)
NEUTROPHILS # BLD AUTO: 5.95 K/UL (ref 2–7.15)
NEUTROPHILS NFR BLD: 65 % (ref 44–72)
NRBC # BLD AUTO: 0 K/UL
NRBC BLD-RTO: 0 /100 WBC
PLATELET # BLD AUTO: 228 K/UL (ref 164–446)
PMV BLD AUTO: 9.5 FL (ref 9–12.9)
POTASSIUM SERPL-SCNC: 4.1 MMOL/L (ref 3.6–5.5)
RBC # BLD AUTO: 4.38 M/UL (ref 4.2–5.4)
SODIUM SERPL-SCNC: 141 MMOL/L (ref 135–145)
TSH SERPL DL<=0.005 MIU/L-ACNC: 1.92 UIU/ML (ref 0.38–5.33)
WBC # BLD AUTO: 9.2 K/UL (ref 4.8–10.8)

## 2018-11-30 PROCEDURE — 700102 HCHG RX REV CODE 250 W/ 637 OVERRIDE(OP): Performed by: PHYSICAL MEDICINE & REHABILITATION

## 2018-11-30 PROCEDURE — 99232 SBSQ HOSP IP/OBS MODERATE 35: CPT | Performed by: PSYCHIATRY & NEUROLOGY

## 2018-11-30 PROCEDURE — G8979 MOBILITY GOAL STATUS: HCPCS | Mod: CI

## 2018-11-30 PROCEDURE — 99406 BEHAV CHNG SMOKING 3-10 MIN: CPT

## 2018-11-30 PROCEDURE — 83880 ASSAY OF NATRIURETIC PEPTIDE: CPT

## 2018-11-30 PROCEDURE — 94760 N-INVAS EAR/PLS OXIMETRY 1: CPT

## 2018-11-30 PROCEDURE — 99233 SBSQ HOSP IP/OBS HIGH 50: CPT | Performed by: INTERNAL MEDICINE

## 2018-11-30 PROCEDURE — A9270 NON-COVERED ITEM OR SERVICE: HCPCS | Performed by: PHYSICAL MEDICINE & REHABILITATION

## 2018-11-30 PROCEDURE — 97162 PT EVAL MOD COMPLEX 30 MIN: CPT

## 2018-11-30 PROCEDURE — 700102 HCHG RX REV CODE 250 W/ 637 OVERRIDE(OP): Performed by: HOSPITALIST

## 2018-11-30 PROCEDURE — 700111 HCHG RX REV CODE 636 W/ 250 OVERRIDE (IP): Performed by: HOSPITALIST

## 2018-11-30 PROCEDURE — A9270 NON-COVERED ITEM OR SERVICE: HCPCS | Performed by: INTERNAL MEDICINE

## 2018-11-30 PROCEDURE — 700111 HCHG RX REV CODE 636 W/ 250 OVERRIDE (IP): Performed by: PHYSICAL MEDICINE & REHABILITATION

## 2018-11-30 PROCEDURE — G8978 MOBILITY CURRENT STATUS: HCPCS | Mod: CK

## 2018-11-30 PROCEDURE — G8988 SELF CARE GOAL STATUS: HCPCS | Mod: CI

## 2018-11-30 PROCEDURE — 700102 HCHG RX REV CODE 250 W/ 637 OVERRIDE(OP): Performed by: INTERNAL MEDICINE

## 2018-11-30 PROCEDURE — 84443 ASSAY THYROID STIM HORMONE: CPT

## 2018-11-30 PROCEDURE — 99239 HOSP IP/OBS DSCHRG MGMT >30: CPT | Performed by: HOSPITALIST

## 2018-11-30 PROCEDURE — 85025 COMPLETE CBC W/AUTO DIFF WBC: CPT

## 2018-11-30 PROCEDURE — 97165 OT EVAL LOW COMPLEX 30 MIN: CPT

## 2018-11-30 PROCEDURE — 80048 BASIC METABOLIC PNL TOTAL CA: CPT

## 2018-11-30 PROCEDURE — A9270 NON-COVERED ITEM OR SERVICE: HCPCS | Performed by: HOSPITALIST

## 2018-11-30 PROCEDURE — 770010 HCHG ROOM/CARE - REHAB SEMI PRIVAT*

## 2018-11-30 PROCEDURE — 99222 1ST HOSP IP/OBS MODERATE 55: CPT | Performed by: PHYSICAL MEDICINE & REHABILITATION

## 2018-11-30 PROCEDURE — G8987 SELF CARE CURRENT STATUS: HCPCS | Mod: CK

## 2018-11-30 RX ORDER — ASPIRIN 300 MG/1
300 SUPPOSITORY RECTAL DAILY
Status: CANCELLED | OUTPATIENT
Start: 2018-12-01

## 2018-11-30 RX ORDER — ASPIRIN 325 MG
325 TABLET ORAL DAILY
Status: CANCELLED | OUTPATIENT
Start: 2018-12-01

## 2018-11-30 RX ORDER — HYDROXYCHLOROQUINE SULFATE 200 MG/1
400 TABLET, FILM COATED ORAL 2 TIMES DAILY
Status: CANCELLED | OUTPATIENT
Start: 2018-11-30

## 2018-11-30 RX ORDER — ATORVASTATIN CALCIUM 40 MG/1
40 TABLET, FILM COATED ORAL EVERY EVENING
Status: DISCONTINUED | OUTPATIENT
Start: 2018-11-30 | End: 2018-11-30 | Stop reason: HOSPADM

## 2018-11-30 RX ORDER — OMEPRAZOLE 20 MG/1
20 CAPSULE, DELAYED RELEASE ORAL DAILY
Status: DISCONTINUED | OUTPATIENT
Start: 2018-12-01 | End: 2018-11-30 | Stop reason: HOSPADM

## 2018-11-30 RX ORDER — ASPIRIN 81 MG/1
324 TABLET, CHEWABLE ORAL DAILY
Status: DISCONTINUED | OUTPATIENT
Start: 2018-12-01 | End: 2018-11-30 | Stop reason: HOSPADM

## 2018-11-30 RX ORDER — ACETAMINOPHEN 325 MG/1
650 TABLET ORAL EVERY 4 HOURS PRN
Status: DISCONTINUED | OUTPATIENT
Start: 2018-11-30 | End: 2018-11-30 | Stop reason: HOSPADM

## 2018-11-30 RX ORDER — ASPIRIN 81 MG/1
324 TABLET, CHEWABLE ORAL DAILY
Status: CANCELLED | OUTPATIENT
Start: 2018-12-01

## 2018-11-30 RX ORDER — POLYVINYL ALCOHOL 14 MG/ML
1 SOLUTION/ DROPS OPHTHALMIC PRN
Status: DISCONTINUED | OUTPATIENT
Start: 2018-11-30 | End: 2018-11-30 | Stop reason: HOSPADM

## 2018-11-30 RX ORDER — HYDRALAZINE HYDROCHLORIDE 25 MG/1
25 TABLET, FILM COATED ORAL EVERY 8 HOURS PRN
Status: DISCONTINUED | OUTPATIENT
Start: 2018-11-30 | End: 2018-11-30 | Stop reason: HOSPADM

## 2018-11-30 RX ORDER — ONDANSETRON 2 MG/ML
4 INJECTION INTRAMUSCULAR; INTRAVENOUS 4 TIMES DAILY PRN
Status: DISCONTINUED | OUTPATIENT
Start: 2018-11-30 | End: 2018-11-30 | Stop reason: HOSPADM

## 2018-11-30 RX ORDER — POLYETHYLENE GLYCOL 3350 17 G/17G
17 POWDER, FOR SOLUTION ORAL
Refills: 3 | Status: ON HOLD
Start: 2018-11-30 | End: 2018-11-30

## 2018-11-30 RX ORDER — ASPIRIN 325 MG
325 TABLET ORAL DAILY
Status: DISCONTINUED | OUTPATIENT
Start: 2018-12-01 | End: 2018-11-30 | Stop reason: HOSPADM

## 2018-11-30 RX ORDER — ONDANSETRON 4 MG/1
4 TABLET, ORALLY DISINTEGRATING ORAL 4 TIMES DAILY PRN
Status: DISCONTINUED | OUTPATIENT
Start: 2018-11-30 | End: 2018-11-30 | Stop reason: HOSPADM

## 2018-11-30 RX ORDER — TRAZODONE HYDROCHLORIDE 50 MG/1
50 TABLET ORAL
Status: DISCONTINUED | OUTPATIENT
Start: 2018-11-30 | End: 2018-11-30 | Stop reason: HOSPADM

## 2018-11-30 RX ORDER — POLYETHYLENE GLYCOL 3350 17 G/17G
1 POWDER, FOR SOLUTION ORAL
Status: DISCONTINUED | OUTPATIENT
Start: 2018-11-30 | End: 2018-11-30 | Stop reason: HOSPADM

## 2018-11-30 RX ORDER — AMOXICILLIN 250 MG
2 CAPSULE ORAL 2 TIMES DAILY
Qty: 30 TAB | Refills: 0 | Status: ON HOLD
Start: 2018-11-30 | End: 2018-11-30

## 2018-11-30 RX ORDER — ALUMINA, MAGNESIA, AND SIMETHICONE 2400; 2400; 240 MG/30ML; MG/30ML; MG/30ML
20 SUSPENSION ORAL
Status: DISCONTINUED | OUTPATIENT
Start: 2018-11-30 | End: 2018-11-30 | Stop reason: HOSPADM

## 2018-11-30 RX ORDER — ONDANSETRON 4 MG/1
4 TABLET, ORALLY DISINTEGRATING ORAL EVERY 4 HOURS PRN
Qty: 10 TAB | Refills: 0 | Status: ON HOLD
Start: 2018-11-30 | End: 2018-11-30

## 2018-11-30 RX ORDER — PREDNISONE 20 MG/1
20 TABLET ORAL DAILY
Status: CANCELLED | OUTPATIENT
Start: 2018-12-01

## 2018-11-30 RX ORDER — ATORVASTATIN CALCIUM 40 MG/1
40 TABLET, FILM COATED ORAL EVERY EVENING
Status: CANCELLED | OUTPATIENT
Start: 2018-11-30

## 2018-11-30 RX ORDER — ASPIRIN 325 MG
325 TABLET ORAL DAILY
Qty: 100 TAB
Start: 2018-12-01

## 2018-11-30 RX ORDER — ECHINACEA PURPUREA EXTRACT 125 MG
2 TABLET ORAL PRN
Status: DISCONTINUED | OUTPATIENT
Start: 2018-11-30 | End: 2018-11-30 | Stop reason: HOSPADM

## 2018-11-30 RX ORDER — AMOXICILLIN 250 MG
2 CAPSULE ORAL 2 TIMES DAILY
Status: DISCONTINUED | OUTPATIENT
Start: 2018-11-30 | End: 2018-11-30 | Stop reason: HOSPADM

## 2018-11-30 RX ORDER — HYDROXYCHLOROQUINE SULFATE 200 MG/1
400 TABLET, FILM COATED ORAL 2 TIMES DAILY
Status: DISCONTINUED | OUTPATIENT
Start: 2018-11-30 | End: 2018-11-30 | Stop reason: HOSPADM

## 2018-11-30 RX ORDER — BISACODYL 10 MG
10 SUPPOSITORY, RECTAL RECTAL
Status: DISCONTINUED | OUTPATIENT
Start: 2018-11-30 | End: 2018-11-30 | Stop reason: HOSPADM

## 2018-11-30 RX ORDER — ATORVASTATIN CALCIUM 40 MG/1
40 TABLET, FILM COATED ORAL EVERY EVENING
Qty: 30 TAB
Start: 2018-11-30

## 2018-11-30 RX ORDER — PREDNISONE 20 MG/1
20 TABLET ORAL DAILY
Status: DISCONTINUED | OUTPATIENT
Start: 2018-12-01 | End: 2018-11-30 | Stop reason: HOSPADM

## 2018-11-30 RX ORDER — OMEPRAZOLE 20 MG/1
20 CAPSULE, DELAYED RELEASE ORAL DAILY
Status: CANCELLED | OUTPATIENT
Start: 2018-12-01

## 2018-11-30 RX ORDER — ASPIRIN 300 MG/1
300 SUPPOSITORY RECTAL DAILY
Status: DISCONTINUED | OUTPATIENT
Start: 2018-12-01 | End: 2018-11-30

## 2018-11-30 RX ADMIN — PREDNISONE 20 MG: 20 TABLET ORAL at 05:44

## 2018-11-30 RX ADMIN — HYDROXYCHLOROQUINE SULFATE 400 MG: 200 TABLET, FILM COATED ORAL at 17:10

## 2018-11-30 RX ADMIN — ENOXAPARIN SODIUM 40 MG: 100 INJECTION SUBCUTANEOUS at 17:46

## 2018-11-30 RX ADMIN — OMEPRAZOLE 20 MG: 20 CAPSULE, DELAYED RELEASE ORAL at 05:43

## 2018-11-30 RX ADMIN — ASPIRIN 325 MG: 325 TABLET, COATED ORAL at 05:43

## 2018-11-30 RX ADMIN — HYDROXYCHLOROQUINE SULFATE 400 MG: 200 TABLET, FILM COATED ORAL at 04:04

## 2018-11-30 ASSESSMENT — GAIT ASSESSMENTS
DISTANCE (FEET): 300
GAIT LEVEL OF ASSIST: CONTACT GUARD ASSIST
DEVIATION: BRADYKINETIC;SHUFFLED GAIT
ASSISTIVE DEVICE: WHEELCHAIR PUSH

## 2018-11-30 ASSESSMENT — ENCOUNTER SYMPTOMS
HEADACHES: 0
HEARTBURN: 0
TINGLING: 1
NECK PAIN: 0
HEMOPTYSIS: 0
CHILLS: 0
NERVOUS/ANXIOUS: 0
MYALGIAS: 0
STRIDOR: 0
PHOTOPHOBIA: 0
BLOOD IN STOOL: 0
DOUBLE VISION: 0
DIZZINESS: 0
VOMITING: 0
BLURRED VISION: 0
SPUTUM PRODUCTION: 0
SENSORY CHANGE: 0
SHORTNESS OF BREATH: 0
PALPITATIONS: 0
COUGH: 0
WEIGHT LOSS: 0
FEVER: 0
SINUS PAIN: 0
BRUISES/BLEEDS EASILY: 0
FOCAL WEAKNESS: 1
DIAPHORESIS: 0
BACK PAIN: 0
WHEEZING: 0
DEPRESSION: 0
POLYDIPSIA: 0
SPEECH CHANGE: 0

## 2018-11-30 ASSESSMENT — COGNITIVE AND FUNCTIONAL STATUS - GENERAL
SUGGESTED CMS G CODE MODIFIER MOBILITY: CK
HELP NEEDED FOR BATHING: A LITTLE
MOVING FROM LYING ON BACK TO SITTING ON SIDE OF FLAT BED: UNABLE
PERSONAL GROOMING: A LITTLE
DAILY ACTIVITIY SCORE: 19
DRESSING REGULAR LOWER BODY CLOTHING: A LITTLE
MOBILITY SCORE: 15
CLIMB 3 TO 5 STEPS WITH RAILING: A LITTLE
MOVING TO AND FROM BED TO CHAIR: UNABLE
TOILETING: A LITTLE
DRESSING REGULAR UPPER BODY CLOTHING: A LITTLE
SUGGESTED CMS G CODE MODIFIER DAILY ACTIVITY: CK
STANDING UP FROM CHAIR USING ARMS: A LITTLE
WALKING IN HOSPITAL ROOM: A LITTLE

## 2018-11-30 ASSESSMENT — ACTIVITIES OF DAILY LIVING (ADL): TOILETING: INDEPENDENT

## 2018-11-30 ASSESSMENT — PAIN SCALES - GENERAL
PAINLEVEL_OUTOF10: 0
PAINLEVEL_OUTOF10: 3

## 2018-11-30 ASSESSMENT — LIFESTYLE VARIABLES
EVER_SMOKED: YES
ALCOHOL_USE: NO

## 2018-11-30 ASSESSMENT — PATIENT HEALTH QUESTIONNAIRE - PHQ9
SUM OF ALL RESPONSES TO PHQ9 QUESTIONS 1 AND 2: 0
2. FEELING DOWN, DEPRESSED, IRRITABLE, OR HOPELESS: NOT AT ALL
1. LITTLE INTEREST OR PLEASURE IN DOING THINGS: NOT AT ALL

## 2018-11-30 NOTE — PROGRESS NOTES
Critical Care Progress Note    Date of admission  11/28/2018    Chief Complaint  77 y.o. female who was brought to ED by daughter after developed right sided weakness, right facial droop while eating dinner, last seen normal at 1630. Pt received tPA at 1825. CT head was done at OSH and was unremarkable.  At ED arrival, pt's BP was 140/90s, HR 70s, CTA head and neck showed <50% stenosis of atherosclerotic intracranial calcification. Post tpa pt's right sided weakness seems to significantly improve. Pt was transferred to ICU post alteplase infusion.      Of note, pt is a current smoker 0.5 ppd >40 years. Drinks occasionally 2 liquor drinks/month. No known hx of CAD, diabetes, or TIA.      At ICU arrival, pt alert, oriented, hemodynamically stable. /80s, HR in 80s. Denies any chest pain, SOB, abd pain, +back pain. No headache. Oriented x2 to place and time    Hospital Course    11/28 admitted for stroke and tpa      Interval Problem Update  Reviewed last 24 hour events:  tpa  Stroke  strenght 4-5, rt side weakness, improved  freq pvc  120-160s sbp  Nectar thick diet  Adequate urine output  t max 99  Peripherals 2  Ns 50 ml/hr  Up to chair  2 l nc  k 4.1  No mg or phos  Aspirin/lovenox overnight  Ok to medicine, 18:25   Shad amaral  Post 24 hour tpa window for stroke    Review of Systems  Review of Systems   Constitutional: Negative for chills, diaphoresis, fever, malaise/fatigue and weight loss.   HENT: Negative for congestion and sinus pain.    Eyes: Negative for blurred vision, double vision and photophobia.   Respiratory: Negative for cough, hemoptysis, sputum production, shortness of breath, wheezing and stridor.    Cardiovascular: Negative for chest pain, palpitations and leg swelling.   Gastrointestinal: Negative for blood in stool, heartburn, melena and vomiting.   Genitourinary: Negative for dysuria and urgency.   Musculoskeletal: Negative for back pain, myalgias and neck pain.   Skin: Negative for itching  and rash.   Neurological: Positive for tingling and focal weakness. Negative for dizziness, sensory change, speech change and headaches.   Endo/Heme/Allergies: Negative for polydipsia. Does not bruise/bleed easily.   Psychiatric/Behavioral: Negative for depression. The patient is not nervous/anxious.         Vital Signs for last 24 hours   Temp:  [36.4 °C (97.5 °F)-37.6 °C (99.7 °F)] 36.4 °C (97.5 °F)  Pulse:  [66-97] 66  Resp:  [11-32] 18    Hemodynamic parameters for last 24 hours       Respiratory       Physical Exam   Physical Exam   Constitutional: She is oriented to person, place, and time. She appears well-developed. No distress.   HENT:   Head: Normocephalic and atraumatic.   Right Ear: External ear normal.   Left Ear: External ear normal.   Mouth/Throat: No oropharyngeal exudate.   Eyes: Pupils are equal, round, and reactive to light. Conjunctivae and EOM are normal. Right eye exhibits no discharge. Left eye exhibits no discharge.   Neck: No JVD present. No tracheal deviation present.   Cardiovascular: Normal rate, regular rhythm and normal heart sounds.    No murmur heard.  Pulmonary/Chest: Effort normal and breath sounds normal. No stridor. No respiratory distress. She has no wheezes. She has no rales. She exhibits no tenderness.   Abdominal: She exhibits no mass. There is no tenderness. There is no rebound and no guarding.   Musculoskeletal: She exhibits no edema, tenderness or deformity.   Neurological: She is alert and oriented to person, place, and time. She displays normal reflexes. A cranial nerve deficit is present. Coordination normal.   Skin: Skin is warm and dry. No rash noted. She is not diaphoretic. No erythema.   Psychiatric: She has a normal mood and affect. Her behavior is normal.       Medications  Current Facility-Administered Medications   Medication Dose Route Frequency Provider Last Rate Last Dose   • senna-docusate (PERICOLACE or SENOKOT S) 8.6-50 MG per tablet 2 Tab  2 Tab Oral BID  Du Ruffin M.D.   2 Tab at 11/29/18 1700    And   • polyethylene glycol/lytes (MIRALAX) PACKET 1 Packet  1 Packet Oral QDAY PRN Du Ruffin M.D.        And   • magnesium hydroxide (MILK OF MAGNESIA) suspension 30 mL  30 mL Oral QDAY PRN Du Ruffin M.D.        And   • bisacodyl (DULCOLAX) suppository 10 mg  10 mg Rectal QDAY PRN Du Ruffin M.D.       • Respiratory Care per Protocol   Nebulization Continuous RT Du Ruffin M.D.       • NS infusion   Intravenous Continuous Du Ruffin M.D. 50 mL/hr at 11/29/18 0043     • atorvastatin (LIPITOR) tablet 40 mg  40 mg Oral Q EVENING Du Ruffin M.D.   40 mg at 11/29/18 1659   • hydrALAZINE (APRESOLINE) injection 10 mg  10 mg Intravenous Q2HRS PRN Du Ruffin M.D.       • ondansetron (ZOFRAN) syringe/vial injection 4 mg  4 mg Intravenous Q4HRS PRN Du Ruffin M.D.       • ondansetron (ZOFRAN ODT) dispertab 4 mg  4 mg Oral Q4HRS PRN Du Ruffin M.D.       • aspirin (ASA) tablet 325 mg  325 mg Oral DAILY Jos Arndt M.D.   325 mg at 11/30/18 0543    Or   • aspirin (ASA) chewable tab 324 mg  324 mg Oral DAILY Jos Arndt M.D.        Or   • aspirin (ASA) suppository 300 mg  300 mg Rectal DAILY Jos Arndt M.D.       • enoxaparin (LOVENOX) inj 40 mg  40 mg Subcutaneous DAILY Jos Arndt M.D.   40 mg at 11/29/18 1914   • hydroxychloroquine (PLAQUENIL) tablet 400 mg  400 mg Oral BID Patrick Grier M.D.   400 mg at 11/30/18 0404   • predniSONE (DELTASONE) tablet 20 mg  20 mg Oral DAILY Patrick Grier M.D.   20 mg at 11/30/18 0544   • omeprazole (PRILOSEC) capsule 20 mg  20 mg Oral DAILY Patrick Grier M.D.   20 mg at 11/30/18 0539   • nitroglycerin 50 mg in D5W 250 ml infusion  0-200 mcg/min Intravenous Continuous Reynaldo Caraballo M.D.   Stopped at 11/28/18 2204       Fluids    Intake/Output Summary (Last 24 hours) at 11/30/18 0700  Last data filed at 11/30/18 0600   Gross per 24 hour   Intake             1440 ml   Output              2035 ml   Net             -595 ml       Laboratory      Recent Labs      11/28/18 2007   TROPONINI  0.01     Recent Labs      11/28/18 2007 11/30/18   0545   SODIUM  142  141   POTASSIUM  3.8  4.1   CHLORIDE  107  108   CO2  28  27   BUN  21  11   CREATININE  0.53  0.45*   CALCIUM  8.8  8.7     Recent Labs      11/28/18 2007 11/30/18   0545   ALTSGPT  11   --    ASTSGOT  12   --    ALKPHOSPHAT  73   --    TBILIRUBIN  0.3   --    GLUCOSE  104*  102*     Recent Labs      11/28/18 2007 11/30/18   0545   WBC  11.5*  9.2   NEUTSPOLYS  66.70  65.00   LYMPHOCYTES  23.50  25.70   MONOCYTES  7.90  7.60   EOSINOPHILS  1.10  1.00   BASOPHILS  0.30  0.20   ASTSGOT  12   --    ALTSGPT  11   --    ALKPHOSPHAT  73   --    TBILIRUBIN  0.3   --      Recent Labs      11/28/18 2007 11/30/18   0545   RBC  4.79  4.38   HEMOGLOBIN  12.6  11.8*   HEMATOCRIT  40.6  37.5   PLATELETCT  241  228   PROTHROMBTM  15.5*   --    APTT  28.6   --    INR  1.21*   --        Imaging  X-Ray:  I have personally reviewed the images and compared with prior images. and No film today  EKG:  I have personally reviewed the images and compared with prior images. and No film today  CT:    Reviewed  MRI acute left thalamic infarct  Echo lvef 65%, normal valves    Assessment/Plan  CVA (cerebral vascular accident) (HCC)- (present on admission)   Assessment & Plan    S/p alteplase with near resolution of her neurological symptoms  Near resolution of weakness/sensory changes  Post 24 hour window for tpa  Keep sbp < 140/90  MRI left thalamic infarct  On losartan  Asa /lovenox ok to start  Echo normal  Conservative transfusion goal, for < 7     Thyroid nodule   Assessment & Plan    tsh appropriate     Rheumatoid arthritis (HCC)   Assessment & Plan    On hydroxychloroquine and prednisone  Currently held     Essential hypertension   Assessment & Plan    Losartan outpt  Permissive htn keep < 180 for tpa  Now goal less than 140/90  Follow up with neurology  outpt     Tobacco use   Assessment & Plan    Discussed cessartion  She is not ready to stop but considering     HLD (hyperlipidemia)- (present on admission)   Assessment & Plan    atorvastatin      pt/ot evaluation    VTE:  Contraindicated, ok to start now that 24 post tpa  Ulcer: Not Indicated  Lines: None    I have performed a physical exam and reviewed and updated ROS and Plan today (11/30/2018). In review of yesterday's note (11/29/2018), there are no changes except as documented above.     Discussed patient condition and risk of morbidity and/or mortality with Hospitalist, RN, RT, Pharmacy, , , Charge nurse / hot rounds, Patient and neurology

## 2018-11-30 NOTE — CONSULTS
Physical Medicine and Rehabilitation Consultation         Initial Consult      Date of Consultation: 11/30/2018  Consulting provider: Varinder Madrigal D.O.  Reason for consultation: Left thalamic CVA assess for acute inpatient rehab appropriateness  Consulting physician Du Ruffin    Chief complaint: Right-sided weakness    HPI: The patient is a 77 y.o. female with a past medical history of dyslipidemia, hypertension, tobacco abuse, remote history of stroke with mild residual right-sided weakness, presented on 11/28/2018  8:05 PM with right-sided weakness and right facial droop, found to have left medial thalamus stroke status post TPA on 11/28 at 6:45 PM.    She was apparently driving to the store when she suddenly became dizzy, reports her vision felt off, daughter reported right-sided weakness and right facial droop as well, she was brought to outside hospital for further evaluation, CT of head at the time showed no intracranial abnormalities.      MRI shows no hemorrhagic conversion,     CTA showed no notable intracranial stenosis of the posterior circulation less than 50% stenosis of ICA    TTE showed normal left atrium size and normal left ejection fraction    Currently she reports significant improvement and right-sided weakness, denies numbness and tingling in right-sided upper and lower extremity, weakness started on 11/28, improved over the past couple of days, no N/V, blurry vision. She denies difficulty with swallowing    She was able to do everything by herself before, daughter lives with her and is able to help, doesn't work  She wants to go home as soon as possible    She denies pain    She reports history of abd mass, scheduled for colonoscopy prior to this happen    Bladder: amaral    ROS:  Gen: No fatigue, confusion, significant weight loss  Eyes: no blurry vision, double vision or pain in eyes  ENT: no changes in hearing, runny nose, nose bleeds, sinus pain  Endo: no episodes of low blood  sugar  CV: No CP, palpitations  Lungs: no shortness of breath, changes in secretions, changes in cough, pain with coughing  Abd: No abd pain, nausea, vomiting, pain with eating  : no blood in urine, suprapubic pain  Ext/MSK: No swelling in legs, asymmetric atrophy  Neuro: no changes in strength or sensation  Skin: no new ulcers/skin breakdown appreciated by patient  Mood: No changes in mood today, increase in depression or anxiety  Heme: no bruising, or bleeding  Allergy: No recent allergies    PMH:  History reviewed. No pertinent past medical history.  DLD, HTN, tobacco abuse, CVA last year    PSH:  History reviewed. No pertinent surgical history.  None    FHX:  History reviewed. No pertinent family history.  No history of renal disease    Medications:  Current Facility-Administered Medications   Medication Dose   • senna-docusate (PERICOLACE or SENOKOT S) 8.6-50 MG per tablet 2 Tab  2 Tab    And   • polyethylene glycol/lytes (MIRALAX) PACKET 1 Packet  1 Packet    And   • magnesium hydroxide (MILK OF MAGNESIA) suspension 30 mL  30 mL    And   • bisacodyl (DULCOLAX) suppository 10 mg  10 mg   • Respiratory Care per Protocol     • NS infusion     • atorvastatin (LIPITOR) tablet 40 mg  40 mg   • hydrALAZINE (APRESOLINE) injection 10 mg  10 mg   • ondansetron (ZOFRAN) syringe/vial injection 4 mg  4 mg   • ondansetron (ZOFRAN ODT) dispertab 4 mg  4 mg   • aspirin (ASA) tablet 325 mg  325 mg    Or   • aspirin (ASA) chewable tab 324 mg  324 mg    Or   • aspirin (ASA) suppository 300 mg  300 mg   • enoxaparin (LOVENOX) inj 40 mg  40 mg   • hydroxychloroquine (PLAQUENIL) tablet 400 mg  400 mg   • predniSONE (DELTASONE) tablet 20 mg  20 mg   • omeprazole (PRILOSEC) capsule 20 mg  20 mg   • nitroglycerin 50 mg in D5W 250 ml infusion  0-200 mcg/min       Allergies:  No Known Allergies    Social Hx:  Pre-morbidly, this patient lived in a single level home with One steps to enter, alone and able to care for self.   Tobacco:  tob abuse  Alcohol: none  Drugs: none    Prior level of function:   Independent    Current level of function:  The patient was evaluated by acute care Physical Therapy, Occupational Therapy and Speech Language Pathology; currently requiring minimal assistance for mobility and moderate assistance for ADLs, also with ongoing swallowing deficits.    Physical Therapy Evaluation completed.   Bed Mobility:  Supine to Sit: Moderate Assist  Transfers: Sit to Stand: Minimal Assist  Gait: Level Of Assist: Contact Guard Assist with WC push         Occupational Therapy Evaluation completed.   Functional Status:  Pt presents to skilled OT services following L medial thalamus small acute ischemic stroke with R sided deficits. Pt performed bed mobility with mod a, LB dressing cga with increased time for R hand integration during fine motor control, toileting min a, toilet t/f with min a, functional ambulation to BR in hallway with cga with w/c push. Pt RUE ROM intact, strength equal bilaterally except  which is mildly impaired compared to LUE, thumb to finger opposition impaired, demonstrates decreased safety awareness and response time minimally. Pt would benefit from acute skilled OT services and would greatly benefit from post acute therapy given pt's high PLOF and acute onset of mild R sided deficits following acute CVA.     Speech Language Therapy Clinical Swallow Evaluation completed.  Functional Status: Patient strict NPO pending evaluation and eager for PO trials. Patient noted to have slight labial droop at rest, but this appears much improved s/p tPA compared to initial notes. Patient with no other gross deficits during oral motor evaluation. Patient consumed PO trials of single ice chips, NTL via tsp, cup sip, and straw, purees, pudding, soft solids, and thin liquids via tsp and cup sip. Patient presented with intermittent throat clearing on thins via cup sip. Coughing was noted x2 out of 4 bites of soft solids. No  "other overt s/sx of aspiration were noted on any other consistencies trialed. Laryngeal elevation palpated as weak. Patient required assistance with feeding d/t RUE weakness. Recommend patient start NTFL diet with 1:1 assistance for feeding. Crush meds in puree. RN aware. SLP to follow. Thank you for the consult.      Recommendations - Diet: Nectar Thick Full Liquid    Physical Exam:  Vitals: Blood pressure 145/95, pulse 70, temperature 36.7 °C (98 °F), temperature source Temporal, resp. rate 15, height 1.676 m (5' 5.98\"), weight 76.9 kg (169 lb 8.5 oz), SpO2 94 %, not currently breastfeeding.  Gen: NAD  HEENT: NC/AT, PERRLA, moist mucous membranes  Cardio: RRR, no mumurs  Pulm: CTAB, with normal respiratory effort  Abd: Soft NTND, active bowel sounds,   Ext: No peripheral edema. No calf tenderness. No clubbing/cyanosis. +dorsal pedalis pulses bilaterally.    Mental status:  A&Ox4 (person, place, date, situation) answers questions appropriately follows commands  Speech: fluent, no aphasia or dysarthria    CRANIAL NERVES:  2,3: visual acuity grossly intact, PERRL  3,4,6: EOMI bilaterally, no nystagmus or diplopia  5: sensation intact to light touch bilaterally and symmetric  7: no facial asymmetry  8: hearing grossly intact  9,10: symmetric palate elevation  11: SCM/Trapezius strength 5/5 bilaterally  12: tongue protrudes midline    Motor:  4/5 on RUE myotomes, 5/5 LUE  5/5 on RLE myotomes, 5/5 LLE  Negative Pronator drift bilaterally    Sensory:   intact to light touch through out      DTRs: 1+ in bilateral biceps, triceps, brachioradialis, 1+ in bilateral patellar and achilles tendons  No clonus at bilateral ankles  Negative babinski b/l  Negative Camara b/l     Tone: no spasticity noted, no cogwheeling noted    Coordination:   intact finger to nose bilaterally      Labs:  Recent Labs      11/28/18 2007 11/30/18   0545   RBC  4.79  4.38   HEMOGLOBIN  12.6  11.8*   HEMATOCRIT  40.6  37.5   PLATELETCT  241  228 "   PROTHROMBTM  15.5*   --    APTT  28.6   --    INR  1.21*   --      Recent Labs      11/28/18 2007 11/30/18   0545   SODIUM  142  141   POTASSIUM  3.8  4.1   CHLORIDE  107  108   CO2  28  27   GLUCOSE  104*  102*   BUN  21  11   CREATININE  0.53  0.45*   CALCIUM  8.8  8.7     Recent Results (from the past 24 hour(s))   EC-ECHOCARDIOGRAM COMPLETE W/O CONT    Collection Time: 11/29/18  4:30 PM   Result Value Ref Range    Eject.Frac. MOD BP 68.34     Eject.Frac. MOD 4C 65.3     Eject.Frac. MOD 2C 70.85     Left Ventrical Ejection Fraction 65    BASIC METABOLIC PANEL    Collection Time: 11/30/18  5:45 AM   Result Value Ref Range    Sodium 141 135 - 145 mmol/L    Potassium 4.1 3.6 - 5.5 mmol/L    Chloride 108 96 - 112 mmol/L    Co2 27 20 - 33 mmol/L    Glucose 102 (H) 65 - 99 mg/dL    Bun 11 8 - 22 mg/dL    Creatinine 0.45 (L) 0.50 - 1.40 mg/dL    Calcium 8.7 8.5 - 10.5 mg/dL    Anion Gap 6.0 0.0 - 11.9   CBC WITH DIFFERENTIAL    Collection Time: 11/30/18  5:45 AM   Result Value Ref Range    WBC 9.2 4.8 - 10.8 K/uL    RBC 4.38 4.20 - 5.40 M/uL    Hemoglobin 11.8 (L) 12.0 - 16.0 g/dL    Hematocrit 37.5 37.0 - 47.0 %    MCV 85.6 81.4 - 97.8 fL    MCH 26.9 (L) 27.0 - 33.0 pg    MCHC 31.5 (L) 33.6 - 35.0 g/dL    RDW 46.4 35.9 - 50.0 fL    Platelet Count 228 164 - 446 K/uL    MPV 9.5 9.0 - 12.9 fL    Neutrophils-Polys 65.00 44.00 - 72.00 %    Lymphocytes 25.70 22.00 - 41.00 %    Monocytes 7.60 0.00 - 13.40 %    Eosinophils 1.00 0.00 - 6.90 %    Basophils 0.20 0.00 - 1.80 %    Immature Granulocytes 0.50 0.00 - 0.90 %    Nucleated RBC 0.00 /100 WBC    Neutrophils (Absolute) 5.95 2.00 - 7.15 K/uL    Lymphs (Absolute) 2.35 1.00 - 4.80 K/uL    Monos (Absolute) 0.70 0.00 - 0.85 K/uL    Eos (Absolute) 0.09 0.00 - 0.51 K/uL    Baso (Absolute) 0.02 0.00 - 0.12 K/uL    Immature Granulocytes (abs) 0.05 0.00 - 0.11 K/uL    NRBC (Absolute) 0.00 K/uL   TSH    Collection Time: 11/30/18  5:45 AM   Result Value Ref Range    TSH 1.920  0.380 - 5.330 uIU/mL   ESTIMATED GFR    Collection Time: 11/30/18  5:45 AM   Result Value Ref Range    GFR If African American >60 >60 mL/min/1.73 m 2    GFR If Non African American >60 >60 mL/min/1.73 m 2   BTYPE NATRIURETIC PEPTIDE    Collection Time: 11/30/18  9:57 AM   Result Value Ref Range    B Natriuretic Peptide 36 0 - 100 pg/mL     MRI of brain 11/29:  Impression         1. Age-related cerebral atrophy.    2. Mild periventricular white matter changes consistent with chronic microvascular ischemic gliosis.    3. Acute left thalamic infarct.       ASSESSMENT:    Patient is a 77 y.o. female admitted with left thalamic infarct now s/p IV TPA administration.    Left medial thalamic CVA: Etiology likely small vessel ischemia  - start prozac 20mg daily for total of 3 months, as per flame trial, this has been shown to improve motor recovery after ischemic CVA  -Restart aspirin 81 mg daily  -Atorvastatin 40 mg p.o. Nightly  -Consult PT/OT  -Consult speech therapy for cognitive evaluation    Dysphagia:   - being followed by SLP, modified diet with thick liquids    Neurogenic bladder: amaral  - DC amaral and start timed voiding    Rehabilitation: Impaired ADLs and mobility  - acute rehab candidate, answered questions about acute rehab    Discussed with pt and family, summarized hospitalization and care, options for next step of care    Discussed with team about recommendations       Patient with multiple co-morbidities(including but not limited to left thalamic CVA, s/p tpa, dysphagia, neurogenic bladder); with cognitive/swallowing deficits and functional deficits in mobility/self-care, and Moderate de-conditioning.     Pre-morbidly, this patient lived in a single level home with One steps to enter, alone and able to care for self. The patient was evaluated by acute care Physical Therapy, Occupational Therapy and Speech Language Pathology; currently requiring moderate assistance for mobility and moderate assistance for  ADLs, also with ongoing cognitive and swallowing deficits.     The patient is a(n) Very Good candidate for an acute inpatient rehabilitation program with a coordinated program of care at an intensity and frequency not available at a lower level of care.     Note: if patient continues to progress while waiting for medical clearance, and no longer requires 2 of of 3 therapy services (PT/OT/SLP) at a CGA/Minimal assistance level, patient will no longer need acute inpatient rehabilitation.    This recommendation is substantiated by the patient's current medical condition with intervention and assessment of medical issues requiring an acute level of care for patient's safety and maximum outcome. A coordinated program of care will be provided by an interdisciplinary team including physical therapy, occupational therapy, speech language pathology, hospitalist, physiatry, rehab nursing and rehab psychology. Rehab goals include improved cognition and swallowing, mobility, self-care management, strength and conditioning/endurance, pain management, bowel and bladder management, mood and affect, and safety with independent home management including caregiver training.     Estimated length of stay is approximately 7-10 days. Rehab potential: Very Good. Disposition: to pre-morbid independent living setting with supportive care of patient's family. We will continue to follow with you in anticipation of discharge to acute inpatient rehabilitation when medically stable to do so at the discretion of her  attending physician. Thank you for allowing us to participate in her care. Please call with any questions regarding this recommendation.    Varinder Madrigal D.O.  Physical Medicine and Rehabilitation

## 2018-11-30 NOTE — PREADMISSION SCREENING NOTE
Pre-Admission Screening Form    Patient Information:   Name: Joselyn Nolan     MRN: [id number]       : 1940      Age: 77 y.o.   Gender: female      Race: White [7]       Marital Status:  [5]  Family Contact: Antonette Torrez        Relationship: Daughter [2]  Home Phone: [phone]           Cell Phone: [phone]  Advanced Directives: None  Code Status:  FULL  Current Attending Provider: Du Ruffin M.D.  Referring Physician: Dr. Ruffin   Physiatrist Consult: Dr. Madrigal    Referral Date: 18  Primary Payor Source:  MEDICARE  Secondary Payor Source:      Medical Information:   Date of Admission to [facility]  Room Number: R107/00  Rehabilitation Diagnosis: 01.2 (R) Body Involvement (L) Brain     There is no immunization history on file for this patient.  No Known Allergies  History reviewed. No pertinent past medical history.  History reviewed. No pertinent surgical history.    History Leading to Admission, Conditions that Caused the Need for Rehab (CMS):     Du Ruffin M.D. Physician Signed Hospital Medicine  H&P Date of Service: 2018 11:57 PM         []Hide copied text  []Hover for attribution information  Hospital Medicine History & Physical Note     Date of Service  2018     Primary Care Physician  No primary care provider on file.     Consultants  Critical care     Code Status  Full code     Chief Complaint  Dizziness and right-sided weakness     History of Presenting Illness  77 y.o. female who presented 2018 with right-sided weakness and right facial droop that started earlier this afternoon.  Patient was last seen normal at 1630.  The daughter found her mother to have right-sided weakness and right facial droop.  The patient reported some dizziness and double vision that lasted for about 30 minutes.  She was taken to an outlying facility where a CT head without contrast was negative for acute process, she was determined to be having an ischemic  stroke and was given IV TPA.  She was transferred to [facility] for further management.  In the ER she underwent CT head with IV contrast that revealed atherosclerotic intracranial calcification of the internal carotid arteries with less than 50% stenosis and hypoplastic distal left vertebral artery.  CTA neck with IV contrast reveals atherosclerotic calcification of the left common carotid artery and bilateral carotid bifurcation less than 50% stenosis.  CT perfusion scan was negative for ischemia or infarct.  At this time her right-sided weakness has improved.     EKG interpreted by me reveals sinus rhythm with ventricular premature complex.  No ST elevation or ST depression noted  Chest x-ray interpreted by me reveals no acute cardiopulmonary process     Review of Systems  Review of Systems   Constitutional: Negative for chills, diaphoresis and fever.   HENT: Negative for hearing loss and sore throat.    Eyes: Negative for blurred vision.   Respiratory: Negative for cough, sputum production, shortness of breath and wheezing.    Cardiovascular: Negative for chest pain, palpitations and leg swelling.   Gastrointestinal: Negative for abdominal pain, blood in stool, diarrhea, nausea and vomiting.   Genitourinary: Negative for dysuria, flank pain and urgency.   Musculoskeletal: Negative for back pain, joint pain, myalgias and neck pain.   Skin: Negative for rash.   Neurological: Positive for dizziness and focal weakness. Negative for seizures and headaches.   Endo/Heme/Allergies: Does not bruise/bleed easily.   Psychiatric/Behavioral: Negative for suicidal ideas.   All other systems reviewed and are negative.        Past Medical History  Hyperlipidemia     Surgical History  No pertinent surgical history     Family History  No pertinent family history     Social History   Denies tobacco use.  Drinks alcohol occasionally.  Denies illicit drug use     Allergies  No Known Allergies     Medications  Prior to Admission  Medications   Prescriptions Last Dose Informant Patient Reported? Taking?   Celecoxib (CELEBREX PO) 11/28/2018 at 1000   Yes Yes   Sig: Take 1 Cap by mouth 2 Times a Day.   LOVASTATIN PO 11/28/2018 at 1000   Yes Yes   Sig: Take 1 Tab by mouth every morning.   PREDNISONE PO 11/28/2018 at 1000   Yes Yes   Sig: Take 1 Tab by mouth every morning.      Facility-Administered Medications: None         Physical Exam  Temp:  [36.4 °C (97.6 °F)-36.5 °C (97.7 °F)] 36.4 °C (97.6 °F)  Pulse:  [74-85] 74  Resp:  [12-20] 20  BP: (145)/(95) 145/95     Physical Exam   Constitutional: She is oriented to person, place, and time. She appears well-developed and well-nourished. No distress.   HENT:   Head: Normocephalic and atraumatic.   Mouth/Throat: Oropharynx is clear and moist.   Eyes: Pupils are equal, round, and reactive to light. Conjunctivae are normal.   Neck: Neck supple. No thyromegaly present.   Cardiovascular: Normal rate, regular rhythm and normal heart sounds.    Pulmonary/Chest: Effort normal and breath sounds normal. No respiratory distress. She has no wheezes. She has no rales.   Abdominal: Soft. Bowel sounds are normal. She exhibits no distension. There is no tenderness. There is no rebound.   Musculoskeletal: Normal range of motion. She exhibits no edema or tenderness.   Neurological: She is alert and oriented to person, place, and time. No cranial nerve deficit. Coordination normal.   Right upper extremity strength 4/5  Left upper extremity strength 5/5  Right upper extremity strength 4/5  Left upper extremity strength 5/5  Sensation intact bilaterally   Skin: Skin is warm and dry.   Psychiatric: She has a normal mood and affect. Her behavior is normal.   Nursing note and vitals reviewed.        Laboratory:      Recent Labs      11/28/18 2007   WBC  11.5*   RBC  4.79   HEMOGLOBIN  12.6   HEMATOCRIT  40.6   MCV  84.8   MCH  26.3*   MCHC  31.0*   RDW  46.2   PLATELETCT  241   MPV  9.3          Recent Labs       11/28/18 2007   SODIUM  142   POTASSIUM  3.8   CHLORIDE  107   CO2  28   GLUCOSE  104*   BUN  21   CREATININE  0.53   CALCIUM  8.8          Recent Labs      11/28/18 2007   ALTSGPT  11   ASTSGOT  12   ALKPHOSPHAT  73   TBILIRUBIN  0.3   GLUCOSE  104*          Recent Labs      11/28/18 2007   APTT  28.6   INR  1.21*                  Recent Labs      11/28/18 2007   TROPONINI  0.01         Urinalysis:        Recent Labs      11/28/18   2132   SPECGRAVITY  1.010   GLUCOSEUR  Negative   KETONES  Negative   NITRITE  Negative   LEUKESTERAS  Negative   WBCURINE  2-5   RBCURINE  2-5*   BACTERIA  Many*   EPITHELCELL  Rare         Imaging:  DX-CHEST-PORTABLE (1 VIEW)   Final Result           1.  No acute cardiopulmonary disease.   2.  Atherosclerosis       CT-CTA NECK WITH & W/O-POST PROCESSING   Final Result           1.  Atherosclerotic calcification of the left common carotid artery and bilateral carotid bifurcation with less than 50% stenosis   2.  Hypoplastic left vertebral artery   3.  Otherwise unremarkable CT angiogram of the neck   4.  Right thyroid low density lesion, appearance suggests small cyst or hypodense nodule. Could be further evaluated with thyroid sonography.       CT-CTA HEAD WITH & W/O-POST PROCESS   Final Result           1.  Atherosclerotic intracranial calcification of the internal carotid arteries with less than 50% stenosis   2.  Hypoplastic distal left vertebral artery   3.  Otherwise CT angiogram of the [place] within normal limits.       OUTSIDE IMAGES-CT HEAD   Final Result       CT-CEREBRAL PERFUSION ANALYSIS   Final Result           1.  Cerebral blood flow less than 30% likely representing completed infarct = 0 mL.       2.  T Max more than 6 seconds likely representing combination of completed infarct and ischemia = 0 mL.       3.  Mismatched volume likely representing ischemic brain/pneumbra = 0 mL.       4.  Please note that the cerebral perfusion was performed on the  limited brain tissue around the basal ganglia region. Infarct/ischemia outside the CT perfusion sections can be missed in this study.       EC-ECHOCARDIOGRAM COMPLETE W/O CONT    (Results Pending)   MR-BRAIN-W/O    (Results Pending)            Assessment/Plan:  I anticipate this patient will require at least two midnights for appropriate medical management, necessitating inpatient admission.         CVA (cerebral vascular accident) (HCC)- (present on admission)   Assessment & Plan     Status post TPA  Patient will be admitted to the ICU with continuous cardiac monitoring and every hour neuro checks  Strict blood pressure control with IV hydralazine and nitroglycerin drip, titrate to SBP less than 180  I will order MRI brain  Check 2-D echo  Patient will be started on full dose aspirin and DVT prophylaxis with Lovenox 24 hours after alteplase infusion   I have started on high intensity statin  Check TSH, lipid panel and hemoglobin A1c  PTOT and ST evaluation            Leukocytosis- (present on admission)   Assessment & Plan     Likely reactive  No evidence of infection at this time  Monitor CBC and vitals      HLD (hyperlipidemia)- (present on admission)   Assessment & Plan     Started on atorvastatin 40 mg daily            VTE prophylaxis: scd        Varinder Madrigal D.O. Physician Signed Physical Medicine & Rehab  Consults Date of Service: 11/30/2018 11:03 AM   Consult Orders:   IP CONSULT FOR PHYSIATRY [[id number]] ordered by Du Ruffin M.D. at 11/30/18 0936      Expand All Collapse All    []Hide copied text                                                  Physical Medicine and Rehabilitation Consultation                                                                                      Initial Consult        Date of Consultation: 11/30/2018  Consulting provider: Varinder Madrigal D.O.  Reason for consultation: Left thalamic CVA assess for acute inpatient rehab appropriateness  Consulting physician Du  Laly     Chief complaint: Right-sided weakness     HPI: The patient is a 77 y.o. female with a past medical history of dyslipidemia, hypertension, tobacco abuse, remote history of stroke with mild residual right-sided weakness, presented on 11/28/2018  8:05 PM with right-sided weakness and right facial droop, found to have left medial thalamus stroke status post TPA on 11/28 at 6:45 PM.     She was apparently driving to the store when she suddenly became dizzy, reports her vision felt off, daughter reported right-sided weakness and right facial droop as well, she was brought to outside hospital for further evaluation, CT of head at the time showed no intracranial abnormalities.       MRI shows no hemorrhagic conversion,      CTA showed no notable intracranial stenosis of the posterior circulation less than 50% stenosis of ICA     TTE showed normal left atrium size and normal left ejection fraction     Currently she reports significant improvement and right-sided weakness, denies numbness and tingling in right-sided upper and lower extremity, weakness started on 11/28, improved over the past couple of days, no N/V, blurry vision. She denies difficulty with swallowing     She was able to do everything by herself before, daughter lives with her and is able to help, doesn't work  She wants to go home as soon as possible     She denies pain     She reports history of abd mass, scheduled for colonoscopy prior to this happen     Bladder: amaral     ROS:  Gen: No fatigue, confusion, significant weight loss  Eyes: no blurry vision, double vision or pain in eyes  ENT: no changes in hearing, runny nose, nose bleeds, sinus pain  Endo: no episodes of low blood sugar  CV: No CP, palpitations  Lungs: no shortness of breath, changes in secretions, changes in cough, pain with coughing  Abd: No abd pain, nausea, vomiting, pain with eating  : no blood in urine, suprapubic pain  Ext/MSK: No swelling in legs, asymmetric  atrophy  Neuro: no changes in strength or sensation  Skin: no new ulcers/skin breakdown appreciated by patient  Mood: No changes in mood today, increase in depression or anxiety  Heme: no bruising, or bleeding  Allergy: No recent allergies     PMH:  Past Medical History   History reviewed. No pertinent past medical history.     DLD, HTN, tobacco abuse, CVA last year     PSH:  Past Surgical History   History reviewed. No pertinent surgical history.     None     FHX:  Family History   History reviewed. No pertinent family history.     No history of renal disease     Medications:       Current Facility-Administered Medications   Medication Dose   • senna-docusate (PERICOLACE or SENOKOT S) 8.6-50 MG per tablet 2 Tab  2 Tab     And   • polyethylene glycol/lytes (MIRALAX) PACKET 1 Packet  1 Packet     And   • magnesium hydroxide (MILK OF MAGNESIA) suspension 30 mL  30 mL     And   • bisacodyl (DULCOLAX) suppository 10 mg  10 mg   • Respiratory Care per Protocol     • NS infusion     • atorvastatin (LIPITOR) tablet 40 mg  40 mg   • hydrALAZINE (APRESOLINE) injection 10 mg  10 mg   • ondansetron (ZOFRAN) syringe/vial injection 4 mg  4 mg   • ondansetron (ZOFRAN ODT) dispertab 4 mg  4 mg   • aspirin (ASA) tablet 325 mg  325 mg     Or   • aspirin (ASA) chewable tab 324 mg  324 mg     Or   • aspirin (ASA) suppository 300 mg  300 mg   • enoxaparin (LOVENOX) inj 40 mg  40 mg   • hydroxychloroquine (PLAQUENIL) tablet 400 mg  400 mg   • predniSONE (DELTASONE) tablet 20 mg  20 mg   • omeprazole (PRILOSEC) capsule 20 mg  20 mg   • nitroglycerin 50 mg in D5W 250 ml infusion  0-200 mcg/min         Allergies:  No Known Allergies     Social Hx:  Pre-morbidly, this patient lived in a single level home with One steps to enter, alone and able to care for self.   Tobacco: tob abuse  Alcohol: none  Drugs: none     Prior level of function:   Independent     Current level of function:  The patient was evaluated by acute care Physical Therapy,  Occupational Therapy and Speech Language Pathology; currently requiring minimal assistance for mobility and moderate assistance for ADLs, also with ongoing swallowing deficits.     Physical Therapy Evaluation completed.   Bed Mobility:  Supine to Sit: Moderate Assist  Transfers: Sit to Stand: Minimal Assist  Gait: Level Of Assist: Contact Guard Assist with WC push          Occupational Therapy Evaluation completed.   Functional Status:  Pt presents to skilled OT services following L medial thalamus small acute ischemic stroke with R sided deficits. Pt performed bed mobility with mod a, LB dressing cga with increased time for R hand integration during fine motor control, toileting min a, toilet t/f with min a, functional ambulation to BR in hallway with cga with w/c push. Pt RUE ROM intact, strength equal bilaterally except  which is mildly impaired compared to LUE, thumb to finger opposition impaired, demonstrates decreased safety awareness and response time minimally. Pt would benefit from acute skilled OT services and would greatly benefit from post acute therapy given pt's high PLOF and acute onset of mild R sided deficits following acute CVA.      Speech Language Therapy Clinical Swallow Evaluation completed.  Functional Status: Patient strict NPO pending evaluation and eager for PO trials. Patient noted to have slight labial droop at rest, but this appears much improved s/p tPA compared to initial notes. Patient with no other gross deficits during oral motor evaluation. Patient consumed PO trials of single ice chips, NTL via tsp, cup sip, and straw, purees, pudding, soft solids, and thin liquids via tsp and cup sip. Patient presented with intermittent throat clearing on thins via cup sip. Coughing was noted x2 out of 4 bites of soft solids. No other overt s/sx of aspiration were noted on any other consistencies trialed. Laryngeal elevation palpated as weak. Patient required assistance with feeding d/t RUE  "weakness. Recommend patient start NTFL diet with 1:1 assistance for feeding. Crush meds in puree. RN aware. SLP to follow. Thank you for the consult.      Recommendations - Diet: Nectar Thick Full Liquid     Physical Exam:  Vitals: Blood pressure 145/95, pulse 70, temperature 36.7 °C (98 °F), temperature source Temporal, resp. rate 15, height 1.676 m (5' 5.98\"), weight 76.9 kg (169 lb 8.5 oz), SpO2 94 %, not currently breastfeeding.  Gen: NAD  HEENT: NC/AT, PERRLA, moist mucous membranes  Cardio: RRR, no mumurs  Pulm: CTAB, with normal respiratory effort  Abd: Soft NTND, active bowel sounds,   Ext: No peripheral edema. No calf tenderness. No clubbing/cyanosis. +dorsal pedalis pulses bilaterally.     Mental status:  A&Ox4 (person, place, date, situation) answers questions appropriately follows commands  Speech: fluent, no aphasia or dysarthria     CRANIAL NERVES:  2,3: visual acuity grossly intact, PERRL  3,4,6: EOMI bilaterally, no nystagmus or diplopia  5: sensation intact to light touch bilaterally and symmetric  7: no facial asymmetry  8: hearing grossly intact  9,10: symmetric palate elevation  11: SCM/Trapezius strength 5/5 bilaterally  12: tongue protrudes midline     Motor:  4/5 on RUE myotomes, 5/5 LUE  5/5 on RLE myotomes, 5/5 LLE  Negative Pronator drift bilaterally     Sensory:   intact to light touch through out        DTRs: 1+ in bilateral biceps, triceps, brachioradialis, 1+ in bilateral patellar and achilles tendons  No clonus at bilateral ankles  Negative babinski b/l  Negative Camara b/l      Tone: no spasticity noted, no cogwheeling noted     Coordination:   intact finger to nose bilaterally        Labs:       Recent Labs      11/28/18 2007 11/30/18   0545   RBC  4.79  4.38   HEMOGLOBIN  12.6  11.8*   HEMATOCRIT  40.6  37.5   PLATELETCT  241  228   PROTHROMBTM  15.5*   --    APTT  28.6   --    INR  1.21*   --            Recent Labs      11/28/18 2007 11/30/18   0545   SODIUM  142  141 "   POTASSIUM  3.8  4.1   CHLORIDE  107  108   CO2  28  27   GLUCOSE  104*  102*   BUN  21  11   CREATININE  0.53  0.45*   CALCIUM  8.8  8.7      Recent Results         Recent Results (from the past 24 hour(s))   EC-ECHOCARDIOGRAM COMPLETE W/O CONT     Collection Time: 11/29/18  4:30 PM   Result Value Ref Range     Eject.Frac. MOD BP 68.34       Eject.Frac. MOD 4C 65.3       Eject.Frac. MOD 2C 70.85       Left Ventrical Ejection Fraction 65     BASIC METABOLIC PANEL     Collection Time: 11/30/18  5:45 AM   Result Value Ref Range     Sodium 141 135 - 145 mmol/L     Potassium 4.1 3.6 - 5.5 mmol/L     Chloride 108 96 - 112 mmol/L     Co2 27 20 - 33 mmol/L     Glucose 102 (H) 65 - 99 mg/dL     Bun 11 8 - 22 mg/dL     Creatinine 0.45 (L) 0.50 - 1.40 mg/dL     Calcium 8.7 8.5 - 10.5 mg/dL     Anion Gap 6.0 0.0 - 11.9   CBC WITH DIFFERENTIAL     Collection Time: 11/30/18  5:45 AM   Result Value Ref Range     WBC 9.2 4.8 - 10.8 K/uL     RBC 4.38 4.20 - 5.40 M/uL     Hemoglobin 11.8 (L) 12.0 - 16.0 g/dL     Hematocrit 37.5 37.0 - 47.0 %     MCV 85.6 81.4 - 97.8 fL     MCH 26.9 (L) 27.0 - 33.0 pg     MCHC 31.5 (L) 33.6 - 35.0 g/dL     RDW 46.4 35.9 - 50.0 fL     Platelet Count 228 164 - 446 K/uL     MPV 9.5 9.0 - 12.9 fL     Neutrophils-Polys 65.00 44.00 - 72.00 %     Lymphocytes 25.70 22.00 - 41.00 %     Monocytes 7.60 0.00 - 13.40 %     Eosinophils 1.00 0.00 - 6.90 %     Basophils 0.20 0.00 - 1.80 %     Immature Granulocytes 0.50 0.00 - 0.90 %     Nucleated RBC 0.00 /100 WBC     Neutrophils (Absolute) 5.95 2.00 - 7.15 K/uL     Lymphs (Absolute) 2.35 1.00 - 4.80 K/uL     Monos (Absolute) 0.70 0.00 - 0.85 K/uL     Eos (Absolute) 0.09 0.00 - 0.51 K/uL     Baso (Absolute) 0.02 0.00 - 0.12 K/uL     Immature Granulocytes (abs) 0.05 0.00 - 0.11 K/uL     NRBC (Absolute) 0.00 K/uL   TSH     Collection Time: 11/30/18  5:45 AM   Result Value Ref Range     TSH 1.920 0.380 - 5.330 uIU/mL   ESTIMATED GFR     Collection Time: 11/30/18  5:45  AM   Result Value Ref Range     GFR If African American >60 >60 mL/min/1.73 m 2     GFR If Non African American >60 >60 mL/min/1.73 m 2   BTYPE NATRIURETIC PEPTIDE     Collection Time: 11/30/18  9:57 AM   Result Value Ref Range     B Natriuretic Peptide 36 0 - 100 pg/mL         MRI of brain 11/29:  Impression          1. Age-related cerebral atrophy.    2. Mild periventricular white matter changes consistent with chronic microvascular ischemic gliosis.    3. Acute left thalamic infarct.         ASSESSMENT:     Patient is a 77 y.o. female admitted with left thalamic infarct now s/p IV TPA administration.     Left medial thalamic CVA: Etiology likely small vessel ischemia  - start prozac 20mg daily for total of 3 months, as per flame trial, this has been shown to improve motor recovery after ischemic CVA  -Restart aspirin 81 mg daily  -Atorvastatin 40 mg p.o. Nightly  -Consult PT/OT  -Consult speech therapy for cognitive evaluation     Dysphagia:   - being followed by SLP, modified diet with thick liquids     Neurogenic bladder: amaral  - DC amaral and start timed voiding     Rehabilitation: Impaired ADLs and mobility  - acute rehab candidate, answered questions about acute rehab     Discussed with pt and family, summarized hospitalization and care, options for next step of care     Discussed with team about recommendations         Patient with multiple co-morbidities(including but not limited to left thalamic CVA, s/p tpa, dysphagia, neurogenic bladder); with cognitive/swallowing deficits and functional deficits in mobility/self-care, and Moderate de-conditioning.      Pre-morbidly, this patient lived in a single level home with One steps to enter, alone and able to care for self. The patient was evaluated by acute care Physical Therapy, Occupational Therapy and Speech Language Pathology; currently requiring moderate assistance for mobility and moderate assistance for ADLs, also with ongoing cognitive and swallowing  deficits.      The patient is a(n) Very Good candidate for an acute inpatient rehabilitation program with a coordinated program of care at an intensity and frequency not available at a lower level of care.      Note: if patient continues to progress while waiting for medical clearance, and no longer requires 2 of of 3 therapy services (PT/OT/SLP) at a CGA/Minimal assistance level, patient will no longer need acute inpatient rehabilitation.     This recommendation is substantiated by the patient's current medical condition with intervention and assessment of medical issues requiring an acute level of care for patient's safety and maximum outcome. A coordinated program of care will be provided by an interdisciplinary team including physical therapy, occupational therapy, speech language pathology, hospitalist, physiatry, rehab nursing and rehab psychology. Rehab goals include improved cognition and swallowing, mobility, self-care management, strength and conditioning/endurance, pain management, bowel and bladder management, mood and affect, and safety with independent home management including caregiver training.      Estimated length of stay is approximately 7-10 days. Rehab potential: Very Good. Disposition: to pre-morbid independent living setting with supportive care of patient's family. We will continue to follow with you in anticipation of discharge to acute inpatient rehabilitation when medically stable to do so at the discretion of her  attending physician. Thank you for allowing us to participate in her care. Please call with any questions regarding this recommendation.     Varinder Madrigal D.O.  Physical Medicine and Rehabilitation         KAMERON Gan Nurse Practitioner Attested Neurology  Consults Date of Service: 11/29/2018  2:48 PM   Consult Orders:   IP Consult to Neurology [[id number]] ordered by Du Ruffin M.D. at 11/29/18 0003      Attestation signed by Lyssa Gomes M.D. at  "11/29/2018 5:48 PM   I saw and evaluated the patient myself independently.  I reviewed the NP note and agree with the following addenda:      Requesting Physician: ICU with attending Du Ruffin M.D.     HPI: In addition to the above written by the NP/resident, no alleviating or exacerbating factors were identified.       Physical Exam:  /95   Pulse 82   Temp 36.4 °C (97.6 °F) (Temporal)   Resp 17   Ht 1.676 m (5' 5.98\")   Wt 76.9 kg (169 lb 8.5 oz)   SpO2 93%   Breastfeeding? No   BMI 27.38 kg/m²         GEN: calmly laying in bed, in NAD  HEENT: non-icteric and non-injected eyes. moist conjunctivae; no lid-lag.  normocephalic.  Nontender sinuses.  Pupils 3mm->2mm bilaterally to light direct & consensual and accommodation. No RADP.  Hearings intact to finger rubs bilaterally. The nares are patent.  Oropharynx clear without lesions and normal hard and soft palates.   NECK: Trachea midline, supple, no notable bruit, No lymphadenopathy  CV: Normal rate and rhythm. No murmurs. No rubs, no S3, S4.  PULM: CTA Bilaterally, no wheezes or crackles, good inspiratory effort and no intercostal retractions.   ABD: Normoactive bowel sounds, soft, nontender, nondistended.   Ext: no edema, Radial and dorsal petal pulses 2+ b/l  SKIN: No rashes, warm, dry, no lesions.  Psychiatric: normal mood, alert and oriented to self, persons, place but confuses L to R.      NIHSS STROKE SCALE:  1a. Level of Consciousness        0= Alert; keenly responsive     1b. LOC Questions (Age & Month)        0= Answers both questions correctly     1c. LOC Commands (Make fist & close eyes)        0= Performs both tasks correctly     2. Best Gaze        0= Normal     3. Visual        0= No visual loss.     4. Facial palsy        1= Minor paralysis (flattened nasolabial fold, asymmetry on smiling)     5a. L Motor arm        0= No drift; limb holds 90 (or 45) degrees for full 10 seconds     5b. R Motor arm        1= Drift; limb holds 90 (or 45) " degrees, but drifts down before full 10 seconds; does not hit bed or other support     6a. L Motor leg        0= No drift; limb holds 90 (or 45) degrees for full 10 seconds     6b. R Motor leg        0= No drift; leg holds 30-degree position for full 5 seconds     7. Limb ataxia        0= Absent     8. Sensory        0= Normal; no sensory loss     9. Best language        0= No aphasia     10. Dysarthria        1= Mild to moderate dysarthria; patient slurs at least some words and at worst, can be understood with some difficulty     11. Extinction and inattention (formerly neglect)        0= No abnormality     Total = 3     Modified Gove Scale:    ___ 0: No symptoms at all    ___ 1: No significant disability, despite symptoms (able to carry out all usual duties and activities).    _x_ 2: Slight disability (unable to carry out all previous activities, but able to look after own affairs without assistance)    ___ 3: Moderate disability (requiring some help, but able to walk without assistance)    ___ 4: Moderately severe disability (unable to walk without assistance and unable to attend to own bodily needs without assistance)    ___ 5: Severe disability (bedridden, incontinent, and requiring constant nursing care and attention)    ___ 6: Dead        Labs: personally reviewed     Radiology/Reports: independently reviewed by me (see below).     ASSESSMENT:  The patient suffered a small acute ischemic stroke in the L medial thalamus on MRI with no hemorrhage on my review. CTA head & neck on my review showed no notable intracranial stenosis of the posterior circulations and <50% stenosis of the intracranial ICAs.  TTE showed normal L atrium size and LV EF.  EKG on my review in sinus rhythm.  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 NIHSS=3 on my exam; mRankin=2. It appears she has improved after IV tPA.      RECOMMENDATIONS:  - Absolutely  avoid anti-thrombotic agent for 24 hrs post-tPA.  After that period would start  mg daily.   - Atorvastatin 40 mg. Will adjust per LDL level.   - Allow NATURAL hypertention upto  - 180, DBP 60 - 105 post-tPA. Absolutely avoid hypotension below 100/50 or hypertension above the upper limit at anytime. Use IV 0.9% normal saline bolus if necessary.   - Neurochecks per post-tPA protocol.  - Keep glucose <180 to avoid increased risk of brain hemorrhagic conversion and poorer brain recovery  - Neurochecks every 4 hours  - Telemetry with continuous pulse oximetry, administer oxgen to keep SPO2 >92%  - for optimal neurological recovery provide the following:               - keep the head of the bed at 30 degrees.              - maintain normothermia and treat low grade fever w/ antipyretics.  Look out for any sign of infections (skin, urine, blood, lungs) and treat promptly.               - maintain normoglycemia.               - normonatremia. Look out for SIADH or cerebral salt wasting.               - provide prophylaxis for potential GI bleed only while NPO.              - avoid valsalva by providing bowel regimen w/ stool softners.               - swallow eval.  - consult PT/OT/Speech/Nutrition     The above was discussed in details with the primary team including Dr. Ruffin.     I have reviewed the images and labs results with the patient, and answered all questions and explained in details the assessment and recommendation sections above. The patient expressed her understanding and agreement to the above.    Lyssa Gomes M.D.  Neurohospitalist            Expand All Collapse All    []Hide copied text  []Hover for attribution information       Chief Complaint   Patient presents with   • Possible Stroke       tpa given, right sided weakness, left sided facial droop.              Problem List Items Addressed This Visit      None               Visit Diagnoses      Acute CVA (cerebrovascular  "accident) (HCC)         Relevant Medications     nitroglycerin 50 mg in D5W 250 ml infusion     atorvastatin (LIPITOR) tablet 40 mg (Start on 11/29/2018  6:00 PM)     hydrALAZINE (APRESOLINE) injection 10 mg     enoxaparin (LOVENOX) inj 40 mg (Start on 11/29/2018  6:30 PM)     losartan (COZAAR) 50 MG Tab             History of present illness:  This is a 77-year old female with PMHx significant for dyslipidemia, hypertension, tobacco abuse (1/2 ppd) and remote history of stroke with mild residual Right sided deficits at baseline who presented to [facility] on 11/28/18 for a chief complaint of worsening Right sided weakness and right facial weakness per daughter. The patient is a poor historian, further details of HPI obtained via review of patient's medical record.   The patient was apparently last seen in her usual state of health around 1630 on 11/28; she was apparently driving to the store when she suddenly became \"dizzy.\" Patient admits that her \"vision felt off,\" however has cannot further describe or characterize this sensation. Patient's daughter (whom patient was with at the time) apparently noted worsening Right sided weakness and Right facial droop as well, thus she was brought to an OSH for further evaluation. CT Brain at that time revealed no acute intracranial abnormality; CTA without acute thrombus or LVO; patient's exclusion criteria was reviewed and patient was determined to be a candidate for IV tPA. Patient received IV tPA bolus at approximately 1845 on 11/28/18.   Currently, patient is sitting up in bed; briskly arousable to voice. States that she feels \"almost back to normal.\" Denies headache or dizziness. Denies weakness, numbness or tingling to any part of the body; admits to mild RUE/RLE to which she admits is baseline. Denies problems with vision (double vision, blurred vision, or loss of vision), speech or swallowing. Denies recent falls or recent trauma/head trauma.       Neurology " has been consulted by Dr. Du Ruffin to further evaluate the findings noted above.      Past medical history:   As noted above.      Past surgical history:   Non contributory.      Family history:   Non contributory.      Social history:   Social History   Social History            Social History   • Marital status:        Spouse name: N/A   • Number of children: N/A   • Years of education: N/A          Occupational History   • Not on file.           Social History Main Topics   • Smoking status: Not on file   • Smokeless tobacco: Not on file   • Alcohol use Not on file   • Drug use: Unknown   • Sexual activity: Not on file           Other Topics Concern   • Not on file          Social History Narrative   • No narrative on file            Current medications:        Current Facility-Administered Medications   Medication Dose   • senna-docusate (PERICOLACE or SENOKOT S) 8.6-50 MG per tablet 2 Tab  2 Tab     And   • polyethylene glycol/lytes (MIRALAX) PACKET 1 Packet  1 Packet     And   • magnesium hydroxide (MILK OF MAGNESIA) suspension 30 mL  30 mL     And   • bisacodyl (DULCOLAX) suppository 10 mg  10 mg   • Respiratory Care per Protocol     • NS infusion     • atorvastatin (LIPITOR) tablet 40 mg  40 mg   • hydrALAZINE (APRESOLINE) injection 10 mg  10 mg   • ondansetron (ZOFRAN) syringe/vial injection 4 mg  4 mg   • ondansetron (ZOFRAN ODT) dispertab 4 mg  4 mg   • aspirin (ASA) tablet 325 mg  325 mg     Or   • aspirin (ASA) chewable tab 324 mg  324 mg     Or   • aspirin (ASA) suppository 300 mg  300 mg   • enoxaparin (LOVENOX) inj 40 mg  40 mg   • hydroxychloroquine (PLAQUENIL) tablet 400 mg  400 mg   • predniSONE (DELTASONE) tablet 20 mg  20 mg   • omeprazole (PRILOSEC) capsule 20 mg  20 mg   • nitroglycerin 50 mg in D5W 250 ml infusion  0-200 mcg/min         Medication Allergy:  No Known Allergies     Review of systems:   Constitutional: denies fever, night sweats, weight loss.   Eyes: denies acute  vision change, eye pain or secretion.   Ears, Nose, Mouth, Throat: denies nasal secretion, nasal bleeding, difficulty swallowing, hearing loss, tinnitus, vertigo, ear pain, acute dental problems, oral ulcers or lesions.   Endocrine: denies recent weight changes, heat or cold intolerance, polyuria, polydypsia, polyphagia,abnormal hair growth.  Cardiovascular: denies new onset of chest pain, palpitations, syncope, or dyspnea of exertion.  Pulmonary: denies shortness of breath, new onset of cough, hemoptysis, wheezing, chest pain or flu-like symptoms.   GI: denies nausea, vomiting, diarrhea, GI bleeding, change in appetite, abdominal pain, and change in bowel habits.  : denies dysuria, urinary incontinence, hematuria.  Heme/oncology: denies history of easy bruising or bleeding. No history of cancer, DVTor PE.  Allergy/immunology: denies hives/urticaria, or itching.   Dermatologic: denies new rash, or new skin lesions.  Musculoskeletal:denies joint swelling or pain, muscle pain, neck and back pain.   Neurologic: As noted above.   Psychiatric: denies symptoms of depression, suicidal or homicidal thoughts.       Physical examination:   Vitals          Vitals:     11/29/18 1300 11/29/18 1400 11/29/18 1500 11/29/18 1600   BP:           Pulse: 79 77 87 82   Resp: 18 20 16 17   Temp:           TempSrc: Silva Silva Silva Silva   SpO2: 94% 94% 96% 93%   Weight:           Height:                 General: Patient in no acute distress, pleasant and cooperative.  HEENT: Normocephalic, no signs of acute trauma.   Neck: supple, no meningeal signs or carotid bruits. There is normal range of motion. No tenderness on exam.   Chest: clear to auscultation. No cough.   CV: RRR, no murmurs.   Skin: no signs of acute rashes or trauma.   Musculoskeletal: joints exhibit full range of motion, without any pain to palpation. There are no signs of joint or muscle swelling. There is no tenderness to deep palpation of muscles.   Psychiatric: No  hallucinatory behavior. Denies symptoms of depression or suicidal ideation. Mood and affect appear normal on exam.      NEUROLOGICAL EXAM:   Mental status, orientation: Awake, alert and fully oriented.   Speech and language: speech is clear and fluent, non-dysarthric. The patient is able to name, repeat and comprehend.   Memory: There is intact recollection of recent and remote events.   Cranial nerve exam: Pupils are 3-4 mm bilaterally and equally reactive to light and accommodation. Visual fields are intact by confrontation. There is no nystagmus on primary or secondary gaze. Intact full EOM in all directions of gaze. Face appears symmetric. Sensation in the face is intact to light touch. Tongue is midline and without any signs of tongue biting or fasciculations. Sternocleidomastoid muscles exhibit is normal strength bilaterally. Shoulder shrug is intact bilaterally.   Motor exam: Strength is 5/5 in LUE/LLE. Strength 4+/5 to RUE/RLE with very mild drift to RUE only. Tone is normal. No abnormal movements were seen on exam.   Sensory exam reveals normal sense of light touch and pinprick in all extremities.   Deep tendon reflexes:  2+ throughout. Plantar responses are flexor. There is no clonus.   Coordination: shows a normal finger-nose-finger. Normal rapidly alternating movements.   Gait: Not assessed at this time.      NIH Stroke Scale     1a Level of Consciousness   1b Orientation Questions   1c Response to Commands   2 Gaze   3 Visual Fields   4 Facial Movement   5 Motor Function (arm)   a Left   b Right 1  6 Motor Function (leg)   a Left   b Right   7 Limb Ataxia   8 Sensory   9 Language   10 Articulation   11 Extinction/Inattention      Score: 1     ANCILLARY DATA REVIEWED:      Lab Data Review:  Recent Results          Recent Results (from the past 24 hour(s))   CBC WITH DIFFERENTIAL     Collection Time: 11/28/18  8:07 PM   Result Value Ref Range     WBC 11.5 (H) 4.8 - 10.8 K/uL     RBC 4.79 4.20 - 5.40 M/uL      Hemoglobin 12.6 12.0 - 16.0 g/dL     Hematocrit 40.6 37.0 - 47.0 %     MCV 84.8 81.4 - 97.8 fL     MCH 26.3 (L) 27.0 - 33.0 pg     MCHC 31.0 (L) 33.6 - 35.0 g/dL     RDW 46.2 35.9 - 50.0 fL     Platelet Count 241 164 - 446 K/uL     MPV 9.3 9.0 - 12.9 fL     Neutrophils-Polys 66.70 44.00 - 72.00 %     Lymphocytes 23.50 22.00 - 41.00 %     Monocytes 7.90 0.00 - 13.40 %     Eosinophils 1.10 0.00 - 6.90 %     Basophils 0.30 0.00 - 1.80 %     Immature Granulocytes 0.50 0.00 - 0.90 %     Nucleated RBC 0.00 /100 WBC     Neutrophils (Absolute) 7.64 (H) 2.00 - 7.15 K/uL     Lymphs (Absolute) 2.70 1.00 - 4.80 K/uL     Monos (Absolute) 0.91 (H) 0.00 - 0.85 K/uL     Eos (Absolute) 0.13 0.00 - 0.51 K/uL     Baso (Absolute) 0.04 0.00 - 0.12 K/uL     Immature Granulocytes (abs) 0.06 0.00 - 0.11 K/uL     NRBC (Absolute) 0.00 K/uL   COMP METABOLIC PANEL     Collection Time: 11/28/18  8:07 PM   Result Value Ref Range     Sodium 142 135 - 145 mmol/L     Potassium 3.8 3.6 - 5.5 mmol/L     Chloride 107 96 - 112 mmol/L     Co2 28 20 - 33 mmol/L     Anion Gap 7.0 0.0 - 11.9     Glucose 104 (H) 65 - 99 mg/dL     Bun 21 8 - 22 mg/dL     Creatinine 0.53 0.50 - 1.40 mg/dL     Calcium 8.8 8.5 - 10.5 mg/dL     AST(SGOT) 12 12 - 45 U/L     ALT(SGPT) 11 2 - 50 U/L     Alkaline Phosphatase 73 30 - 99 U/L     Total Bilirubin 0.3 0.1 - 1.5 mg/dL     Albumin 3.4 3.2 - 4.9 g/dL     Total Protein 6.2 6.0 - 8.2 g/dL     Globulin 2.8 1.9 - 3.5 g/dL     A-G Ratio 1.2 g/dL   PROTHROMBIN TIME     Collection Time: 11/28/18  8:07 PM   Result Value Ref Range     PT 15.5 (H) 12.0 - 14.6 sec     INR 1.21 (H) 0.87 - 1.13   APTT     Collection Time: 11/28/18  8:07 PM   Result Value Ref Range     APTT 28.6 24.7 - 36.0 sec   COD (ADULT)     Collection Time: 11/28/18  8:07 PM   Result Value Ref Range     ABO Grouping Only O       Rh Grouping Only POS       Antibody Screen-Cod NEG     TROPONIN     Collection Time: 11/28/18  8:07 PM   Result Value Ref Range      Troponin I 0.01 0.00 - 0.04 ng/mL   ESTIMATED GFR     Collection Time: 18  8:07 PM   Result Value Ref Range     GFR If African American >60 >60 mL/min/1.73 m 2     GFR If Non African American >60 >60 mL/min/1.73 m 2   ACCU-CHEK GLUCOSE     Collection Time: 18  8:07 PM   Result Value Ref Range     Glucose - Accu-Ck 96 65 - 99 mg/dL   EKG (NOW)     Collection Time: 18  8:50 PM   Result Value Ref Range     Report           [facility] Emergency Dept.     Test Date:  2018  Pt Name:    RONN JETT                 Department: ER  MRN:        [id number]                      Room:       [facility]  Gender:     Female                       Technician: [id number]  :        [id number]                   Requested By:RISHI ARRIAZA  Order #:    [id number]                    Reading MD:     Measurements  Intervals                                Axis  Rate:       75                           P:          28  ME:         188                          QRS:        -22  QRSD:       96                           T:          19  QT:         420  QTc:        470     Interpretive Statements  SINUS RHYTHM  VENTRICULAR PREMATURE COMPLEX  BORDERLINE LEFT AXIS DEVIATION  CONSIDER ANTERIOR INFARCT  No previous ECG available for comparison      URINALYSIS CULTURE, IF INDICATED     Collection Time: 18  9:32 PM   Result Value Ref Range     Micro Urine Req Microscopic       Color Yellow       Character Clear       Specific Gravity 1.010 <1.035     Ph 6.5 5.0 - 8.0     Glucose Negative Negative mg/dL     Ketones Negative Negative mg/dL     Protein Negative Negative mg/dL     Bilirubin Negative Negative     Urobilinogen, Urine 0.2 Negative     Nitrite Negative Negative     Leukocyte Esterase Negative Negative     Occult Blood Trace (A) Negative   URINE MICROSCOPIC (W/UA)     Collection Time: 18  9:32 PM   Result Value Ref Range     WBC 2-5 /hpf     RBC 2-5 (A) /hpf     Bacteria Many (A) None /hpf      Epithelial Cells Rare /hpf   ABO AND RH CONFIRMATION     Collection Time: 11/29/18  2:05 AM   Result Value Ref Range     ABO Confirm O       Second Rh Group POS              Labs reviewed by me.         Imaging reviewed by me:      EC-ECHOCARDIOGRAM COMPLETE W/O CONT           MR-BRAIN-W/O   Final Result           1. Age-related cerebral atrophy.       2. Mild periventricular white matter changes consistent with chronic microvascular ischemic gliosis.       3. Acute left thalamic infarct.       DX-SHOULDER 2+ RIGHT   Final Result       1.  No acute osseous abnormality.   2.  Moderate to severe acromioclavicular and glenohumeral arthrosis.       DX-CHEST-PORTABLE (1 VIEW)   Final Result           1.  No acute cardiopulmonary disease.   2.  Atherosclerosis       CT-CTA NECK WITH & W/O-POST PROCESSING   Final Result           1.  Atherosclerotic calcification of the left common carotid artery and bilateral carotid bifurcation with less than 50% stenosis   2.  Hypoplastic left vertebral artery   3.  Otherwise unremarkable CT angiogram of the neck   4.  Right thyroid low density lesion, appearance suggests small cyst or hypodense nodule. Could be further evaluated with thyroid sonography.       CT-CTA HEAD WITH & W/O-POST PROCESS   Final Result           1.  Atherosclerotic intracranial calcification of the internal carotid arteries with less than 50% stenosis   2.  Hypoplastic distal left vertebral artery   3.  Otherwise CT angiogram of the [place] within normal limits.       OUTSIDE IMAGES-CT HEAD   Final Result       CT-CEREBRAL PERFUSION ANALYSIS   Final Result           1.  Cerebral blood flow less than 30% likely representing completed infarct = 0 mL.       2.  T Max more than 6 seconds likely representing combination of completed infarct and ischemia = 0 mL.       3.  Mismatched volume likely representing ischemic brain/pneumbra = 0 mL.       4.  Please note that the cerebral perfusion was performed on  the limited brain tissue around the basal ganglia region. Infarct/ischemia outside the CT perfusion sections can be missed in this study.          CT: No acute intracranial abnormality.   CTA Brain/neck: No hemodynamically significant (>50%) stenosis.         Presumed mechanism by TOAST:  __Large Artery Atherosclerosis  _X_Small Vessel (Lacunar)  __Cardioembolic  __Other (Sickle Cell, Vasculitis, Hypercoagulable)  __Unknown     Vs. Left PCA territory.      ASSESSMENT AND PLAN:  77-year old female with dyslipidemia, hypertension, tobacco abuse (1/2 ppd) and remote history of stroke with mild residual Right sided deficits at baseline who presented to [facility] on 11/28/18 for a chief complaint of worsening Right sided weakness and right facial weakness, also with non-specific visual deficit. Patient received IV tPA at 1845 on 11/28/18; NIHSS currently 1, significant only for slight RUE weakness/drift. Note patient's MRI Brain has revealed small acute ischemic infarction involving Left thalamus, small vessel/lacunar etiology vs. Left PCA territory.      Impression:   Acute Left thalamic infarction; lacunar etiology vs. Left PCA territory.   History of multiple vascular risk factors for stroke, including dyslipidemia, hypertension, and tobacco abuse.    Remote history of ischemic stroke.      Recommendations/Plan:     -q4h and PRN neuro assessment. VS per nursing/unit protocol. SBP < 180.   -Telemetry; Screen for Afib. If no Afib identified during admission, patient may be appropriate for outpatient Loop/holter monitoring to be arranged by PCP/Cardiology. TTE with bubble study completed, results pending.   -OK to initiate ASA, no hemorrhage per repeat imaging/MRI.   -Atorvastatin 40 mg PO q HS. Lipid panel pending.   -Recommend aggressive BG management per primary team. A1c pending.   -PT/OT/SLP eval and treat. Physiatry consult.    -Counseled patient at length regarding life style and risk factor modification  (including importance of smoking cessation) for secondary stroke prevention.   -All other medical management per primary team.   -DVT Prophylaxis: SCDs.      The plan of care above has been discussed with Dr. Gomes.      Shaye Armas MSN, BSN, [facility]  [employer]            Cosigned by: Lyssa Gomes M.D. at 11/29/2018  5:48 PM     Tate Lopez D.O. Physician Signed Pulmonary  Consults Date of Service: 11/29/2018  1:23 AM      Expand All Collapse All    []Hide copied text  Critical Care Consultation     Date of consult: 11/29/2018     Referring Physician  No att. providers found     Reason for Consultation  Post alteplase     History of Presenting Illness  77 y.o. female who was brought to ED by daughter after developed right sided weakness, right facial droop while eating dinner, last seen normal at 1630. Pt received tPA at 1825. CT head was done at OSH and was unremarkable.  At ED arrival, pt's BP was 140/90s, HR 70s, CTA head and neck showed <50% stenosis of atherosclerotic intracranial calcification. Post tpa pt's right sided weakness seems to significantly improve. Pt was transferred to ICU post alteplase infusion.      Of note, pt is a current smoker 0.5 ppd >40 years. Drinks occasionally 2 liquor drinks/month. No known hx of CAD, diabetes, or TIA.      At ICU arrival, pt alert, oriented, hemodynamically stable. /80s, HR in 80s. Denies any chest pain, SOB, abd pain, +back pain. No headache. Oriented x2 to place and time     Code Status  Full Code     Review of Systems  Review of Systems   Constitutional: Negative for fatigue and fever.   Respiratory: Negative for shortness of breath and wheezing.    Cardiovascular: Negative for chest pain, palpitations and leg swelling.   Gastrointestinal: Negative for abdominal pain, diarrhea, nausea and vomiting.   Genitourinary: Negative for dysuria, flank pain and frequency.   Musculoskeletal: Positive for back pain.   Neurological: Positive for  facial asymmetry. Negative for seizures, speech difficulty, light-headedness, numbness and headaches.         Past Medical History   has no past medical history on file.     Surgical History   has no past surgical history on file.     Family History  family history is not on file.     Social History     Medications      Home Medications             Reviewed by Radhika Bolden (Pharmacy Tech) on 11/28/18 at 2316  Med List Status: Partial          Medication Last Dose Status    Celecoxib (CELEBREX PO) 11/28/2018 Active    LOVASTATIN PO 11/28/2018 Active    PREDNISONE PO 11/28/2018 Active                  Current Medications             Current Facility-Administered Medications   Medication Dose Route Frequency Provider Last Rate Last Dose   • senna-docusate (PERICOLACE or SENOKOT S) 8.6-50 MG per tablet 2 Tab  2 Tab Oral BID Du Ruffin M.D.         And   • polyethylene glycol/lytes (MIRALAX) PACKET 1 Packet  1 Packet Oral QDAY PRN Du Ruffin M.D.         And   • magnesium hydroxide (MILK OF MAGNESIA) suspension 30 mL  30 mL Oral QDAY PRN Du Ruffin M.D.         And   • bisacodyl (DULCOLAX) suppository 10 mg  10 mg Rectal QDAY PRN Du Ruffin M.D.       • Respiratory Care per Protocol   Nebulization Continuous RT Du Ruffin M.D.       • NS infusion   Intravenous Continuous Du Ruffin M.D. 50 mL/hr at 11/29/18 0043     • atorvastatin (LIPITOR) tablet 40 mg  40 mg Oral Q EVENING Du Ruffin M.D.       • hydrALAZINE (APRESOLINE) injection 10 mg  10 mg Intravenous Q2HRS PRN Du Ruffin M.D.       • [START ON 11/30/2018] aspirin (ASA) tablet 325 mg  325 mg Oral DAILY Du Ruffin M.D.         Or   • [START ON 11/30/2018] aspirin (ASA) chewable tab 324 mg  324 mg Oral DAILY Du Ruffin M.D.         Or   • [START ON 11/30/2018] aspirin (ASA) suppository 300 mg  300 mg Rectal DAILY Du Ruffin M.D.       • ondansetron (ZOFRAN) syringe/vial injection 4 mg  4 mg Intravenous Q4HRS PRN Du Ruffin M.D.       •  ondansetron (ZOFRAN ODT) dispertab 4 mg  4 mg Oral Q4HRS PRN Du Ruffin M.D.       • nitroglycerin 50 mg in D5W 250 ml infusion  0-200 mcg/min Intravenous Continuous Reynaldo Caraballo M.D.   Stopped at 11/28/18 2204            Allergies  No Known Allergies     Vital Signs last 24 hours  Temp:  [36.5 °C (97.7 °F)] 36.5 °C (97.7 °F)  Pulse:  [74-85] 75  Resp:  [12-20] 16  BP: (145)/(95) 145/95     Physical Exam  Physical Exam   Constitutional: She appears well-nourished. No distress.   HENT:   Head: Normocephalic and atraumatic.   Neck: Neck supple.   Cardiovascular: Normal rate, regular rhythm and normal heart sounds.    No murmur heard.  Pulmonary/Chest: Effort normal and breath sounds normal. No respiratory distress. She has no wheezes. She has no rales.   Abdominal: Soft. Bowel sounds are normal. She exhibits no distension. There is no tenderness. There is no rebound and no guarding.   Neurological: She is alert. No cranial nerve deficit. She exhibits normal muscle tone. Coordination normal.   + slight right facial droop  CN II, III, IV, VI, V, IX, X, XI, XII all intact  No cerebellar dysfunction with finger to nose test    Skin: Skin is warm. No rash noted. No erythema.   Psychiatric: She has a normal mood and affect.   Nursing note and vitals reviewed.        Fluids  No intake or output data in the 24 hours ending 11/29/18 0123     Laboratory  Recent Results          Recent Results (from the past 48 hour(s))   CBC WITH DIFFERENTIAL     Collection Time: 11/28/18  8:07 PM   Result Value Ref Range     WBC 11.5 (H) 4.8 - 10.8 K/uL     RBC 4.79 4.20 - 5.40 M/uL     Hemoglobin 12.6 12.0 - 16.0 g/dL     Hematocrit 40.6 37.0 - 47.0 %     MCV 84.8 81.4 - 97.8 fL     MCH 26.3 (L) 27.0 - 33.0 pg     MCHC 31.0 (L) 33.6 - 35.0 g/dL     RDW 46.2 35.9 - 50.0 fL     Platelet Count 241 164 - 446 K/uL     MPV 9.3 9.0 - 12.9 fL     Neutrophils-Polys 66.70 44.00 - 72.00 %     Lymphocytes 23.50 22.00 - 41.00 %     Monocytes 7.90  0.00 - 13.40 %     Eosinophils 1.10 0.00 - 6.90 %     Basophils 0.30 0.00 - 1.80 %     Immature Granulocytes 0.50 0.00 - 0.90 %     Nucleated RBC 0.00 /100 WBC     Neutrophils (Absolute) 7.64 (H) 2.00 - 7.15 K/uL     Lymphs (Absolute) 2.70 1.00 - 4.80 K/uL     Monos (Absolute) 0.91 (H) 0.00 - 0.85 K/uL     Eos (Absolute) 0.13 0.00 - 0.51 K/uL     Baso (Absolute) 0.04 0.00 - 0.12 K/uL     Immature Granulocytes (abs) 0.06 0.00 - 0.11 K/uL     NRBC (Absolute) 0.00 K/uL   COMP METABOLIC PANEL     Collection Time: 11/28/18  8:07 PM   Result Value Ref Range     Sodium 142 135 - 145 mmol/L     Potassium 3.8 3.6 - 5.5 mmol/L     Chloride 107 96 - 112 mmol/L     Co2 28 20 - 33 mmol/L     Anion Gap 7.0 0.0 - 11.9     Glucose 104 (H) 65 - 99 mg/dL     Bun 21 8 - 22 mg/dL     Creatinine 0.53 0.50 - 1.40 mg/dL     Calcium 8.8 8.5 - 10.5 mg/dL     AST(SGOT) 12 12 - 45 U/L     ALT(SGPT) 11 2 - 50 U/L     Alkaline Phosphatase 73 30 - 99 U/L     Total Bilirubin 0.3 0.1 - 1.5 mg/dL     Albumin 3.4 3.2 - 4.9 g/dL     Total Protein 6.2 6.0 - 8.2 g/dL     Globulin 2.8 1.9 - 3.5 g/dL     A-G Ratio 1.2 g/dL   PROTHROMBIN TIME     Collection Time: 11/28/18  8:07 PM   Result Value Ref Range     PT 15.5 (H) 12.0 - 14.6 sec     INR 1.21 (H) 0.87 - 1.13   APTT     Collection Time: 11/28/18  8:07 PM   Result Value Ref Range     APTT 28.6 24.7 - 36.0 sec   COD (ADULT)     Collection Time: 11/28/18  8:07 PM   Result Value Ref Range     ABO Grouping Only O       Rh Grouping Only POS       Antibody Screen-Cod NEG     TROPONIN     Collection Time: 11/28/18  8:07 PM   Result Value Ref Range     Troponin I 0.01 0.00 - 0.04 ng/mL   ESTIMATED GFR     Collection Time: 11/28/18  8:07 PM   Result Value Ref Range     GFR If African American >60 >60 mL/min/1.73 m 2     GFR If Non African American >60 >60 mL/min/1.73 m 2   ACCU-CHEK GLUCOSE     Collection Time: 11/28/18  8:07 PM   Result Value Ref Range     Glucose - Accu-Ck 96 65 - 99 mg/dL   EKG (NOW)      Collection Time: 18  8:50 PM   Result Value Ref Range     Report           [facility] Emergency Dept.     Test Date:  2018  Pt Name:    RONN JETT                 Department: ER  MRN:        [id number]                      Room:        08  Gender:     Female                       Technician: [id number]  :        [id number]                   Requested By:RISHI ARRIAZA  Order #:    [id number]                    Reading MD:     Measurements  Intervals                                Axis  Rate:       75                           P:          28  MI:         188                          QRS:        -22  QRSD:       96                           T:          19  QT:         420  QTc:        470     Interpretive Statements  SINUS RHYTHM  VENTRICULAR PREMATURE COMPLEX  BORDERLINE LEFT AXIS DEVIATION  CONSIDER ANTERIOR INFARCT  No previous ECG available for comparison      URINALYSIS CULTURE, IF INDICATED     Collection Time: 18  9:32 PM   Result Value Ref Range     Micro Urine Req Microscopic       Color Yellow       Character Clear       Specific Gravity 1.010 <1.035     Ph 6.5 5.0 - 8.0     Glucose Negative Negative mg/dL     Ketones Negative Negative mg/dL     Protein Negative Negative mg/dL     Bilirubin Negative Negative     Urobilinogen, Urine 0.2 Negative     Nitrite Negative Negative     Leukocyte Esterase Negative Negative     Occult Blood Trace (A) Negative   URINE MICROSCOPIC (W/UA)     Collection Time: 18  9:32 PM   Result Value Ref Range     WBC 2-5 /hpf     RBC 2-5 (A) /hpf     Bacteria Many (A) None /hpf     Epithelial Cells Rare /hpf            Imaging  DX-CHEST-PORTABLE (1 VIEW)   Final Result           1.  No acute cardiopulmonary disease.   2.  Atherosclerosis       CT-CTA NECK WITH & W/O-POST PROCESSING   Final Result           1.  Atherosclerotic calcification of the left common carotid artery and bilateral carotid bifurcation with less than 50% stenosis   2.   Hypoplastic left vertebral artery   3.  Otherwise unremarkable CT angiogram of the neck   4.  Right thyroid low density lesion, appearance suggests small cyst or hypodense nodule. Could be further evaluated with thyroid sonography.       CT-CTA HEAD WITH & W/O-POST PROCESS   Final Result           1.  Atherosclerotic intracranial calcification of the internal carotid arteries with less than 50% stenosis   2.  Hypoplastic distal left vertebral artery   3.  Otherwise CT angiogram of the [place] within normal limits.       OUTSIDE IMAGES-CT HEAD   Final Result       CT-CEREBRAL PERFUSION ANALYSIS   Final Result           1.  Cerebral blood flow less than 30% likely representing completed infarct = 0 mL.       2.  T Max more than 6 seconds likely representing combination of completed infarct and ischemia = 0 mL.       3.  Mismatched volume likely representing ischemic brain/pneumbra = 0 mL.       4.  Please note that the cerebral perfusion was performed on the limited brain tissue around the basal ganglia region. Infarct/ischemia outside the CT perfusion sections can be missed in this study.       EC-ECHOCARDIOGRAM COMPLETE W/O CONT    (Results Pending)   MR-BRAIN-W/O    (Results Pending)         Assessment/Plan      CVA (cerebral vascular accident) ([facility])   Assessment & Plan     S/p alteplase with near resolution of her neurological symptoms  Watch for complications of alteplase that includes intracerebral hemorrhage, systemic bleeding.   - Frequent neuro check Q1H  - Goal to keep BP <180/105 in the first 24 hours.   - MRI brain   - Start aspirin after at least 24 hours of alteplase infusion and after MRI brain   - TTE  - US carotids  - Transfuse when Hgb <7            Discussed patient condition and risk of morbidity and/or mortality with RN and Patient.    Pt is at risk of bleeding after alteplase infusion. Need close monitoring of neuro check, labs and hemodynamics.      The patient remains critically ill.   "Critical care time = 40 minutes in directly providing and coordinating critical care and extensive data review.  No time overlap and excludes procedures.     Tate Lopez, DO  Critical Care              Brain MRI 11-29-18:  1. Age-related cerebral atrophy.    2. Mild periventricular white matter changes consistent with chronic microvascular ischemic gliosis.    3. Acute left thalamic infarct.    Co-morbidities: See PMH  Potential Risk - Complications: Aphasia, Cognitive Impairment, Contractures, Deep Vein Thrombosis, Dysphagia, Incontinence, Malnutrition, Pain, Perceptual Impairment, Pneumonia, Pressure Ulcer and Urinary Tract Infection  Level of Risk: High    Ongoing Medical Management Needed (Medical/Nursing Needs):   Patient Active Problem List    Diagnosis Date Noted   • CVA (cerebral vascular accident) (HCC) 11/29/2018     Priority: High   • HLD (hyperlipidemia) 11/29/2018   • Leukocytosis 11/29/2018   • Tobacco use 11/29/2018   • Essential hypertension 11/29/2018   • Rheumatoid arthritis (HCC) 11/29/2018   • Thyroid nodule 11/29/2018     A & O    Current Vital Signs:   Temperature: 36.7 °C (98 °F) Pulse: 70 Respiration: 15 Blood Pressure : 145/95  Weight: 76.9 kg (169 lb 8.5 oz) Height: 167.6 cm (5' 5.98\")  Pulse Oximetry: 94 % O2 (LPM): 1      Completed Laboratory Reports:  Recent Labs      11/28/18 2007 11/30/18   0545   WBC  11.5*  9.2   HEMOGLOBIN  12.6  11.8*   HEMATOCRIT  40.6  37.5   PLATELETCT  241  228   SODIUM  142  141   POTASSIUM  3.8  4.1   BUN  21  11   CREATININE  0.53  0.45*   ALBUMIN  3.4   --    GLUCOSE  104*  102*   POCGLUCOSE  96   --    INR  1.21*   --      Additional Labs: Not Applicable    Prior Living Situation:   Housing / Facility: 1 Story House  Steps Into Home: 0  Steps In Home: 0  Lives with - Patient's Self Care Capacity: Adult Children, Child Less than 18 Years of Age  Equipment Owned: Front-Wheel Walker, Tub / Shower Seat    Prior Level of Function / Living Situation: "   Physical Therapy: Prior Services: Intermittent Physical Support for ADL Per Family  Housing / Facility: [address] [facility]  Steps Into Home: 0  Steps In Home: 0  Bathroom Set up: Bathtub / Shower Combination  Equipment Owned: Front-Wheel Walker, Tub / Shower Seat  Lives with - Patient's Self Care Capacity: Adult Children, Child Less than 18 Years of Age  Bed Mobility: Independent  Transfer Status: Independent  Ambulation: Independent  Distance Ambulation (Feet): 300  Assistive Devices Used: None  Current Level of Function:   Level Of Assist: Contact Guard Assist  Assistive Device: Wheelchair Push  Distance (Feet): 300  Deviation: Bradykinetic, Shuffled Gait  Weight Bearing Status: no restrictions   Supine to Sit: Moderate Assist  Sit to Supine:  (up to chair )  Scooting: Contact Guard Assist  Rolling: Modified Independent  Sit to Stand: Minimal Assist  Bed, Chair, Wheelchair Transfer: Minimal Assist  Toilet Transfers: Minimal Assist  Transfer Method: Stand Pivot  Sitting in Chair: Pt left sitting up in chair at end of session   Sitting Edge of Bed: 5-6 min  Standing: 10-12 min total   Occupational Therapy:   Self Feeding: Independent  Grooming / Hygiene: Independent  Bathing: Independent  Dressing: Independent  Toileting: Independent  Medication Management: Independent  Laundry: Independent  Kitchen Mobility: Independent  Finances: Independent  Home Management: Independent  Shopping: Independent  Prior Level Of Mobility: Independent Without Device in Community  Driving / Transportation: Driving Independent  Prior Services: Intermittent Physical Support for ADL Per Family  Housing / Facility: [address] [facility]  Occupation (Pre-Hospital Vocational): Retired Due To Age  Current Level of Function:   Eating: Supervision  Bathing: Not Tested  Upper Body Dressing: Contact Guard Assist  Lower Body Dressing: Minimal Assist  Toileting: Minimal Assist  Speech Language Pathology:   Assessment : Patient seen this date for clinical  swallow evaluation. Patient strict NPO pending evaluation and eager for PO trials. Patient noted to have slight labial droop at rest, but this appears much improved s/p tPA compared to initial notes. Patient with no other gross deficits during oral motor evaluation. Patient consumed PO trials of single ice chips, NTL via tsp, cup sip, and straw, purees, pudding, soft solids, and thin liquids via tsp and cup sip. Patient presented with intermittent throat clearing on thins via cup sip. Coughing was noted x2 out of 4 bites of soft solids. No other overt s/sx of aspiration were noted on any other consistencies trialed. Laryngeal elevation palpated as weak. Patient required assistance with feeding d/t RUE weakness. Recommend patient start NTFL diet with 1:1 assistance for feeding. Crush meds in puree. RN aware. SLP to follow. Thank you for the consult.   Problem List: Dysphagia  Diet / Liquid Recommendation: Nectar Thick Full Liquid  Rehabilitation Prognosis/Potential: Good  Estimated Length of Stay: 7-10 days    Nursing:   Orientation : Oriented x 4  Silva in Place    Scope/Intensity of Services Recommended:  Physical Therapy: 1 hr / day  5 days / week. Therapeutic Interventions Required: Maximize Endurance, Mobility, Strength and Safety  Occupational Therapy: 1 hr / day 5 days / week. Therapeutic Interventions Required: Maximize Self Care, ADLs, IADLs and Energy Conservation  Speech & Language Pathology: 1 hr / day 5 days / week. Therapeutic Interventions Required: Maximize Cognition, Swallowing and Safety  Rehabilitation Nursin/7. Therapeutic Interventions Required: Monitor Pain, Skin, Vital Signs, Intake and Output, Labs, Safety, Aspiration Risk and Family Training  Rehabilitation Physician: 3 - 5 days / week. Therapeutic Interventions Required: Medical Management  Respiratory Care: Pulmonary Toileting. Therapeutic Interventions Required: Pulmonary Toileting, O2 Weaning and Aspiration Risk    Rehabilitation  Goals and Plan (Expected frequency & duration of treatment in the IRF):   Return to the Community, Modified Independent Level of Care and Family Able to Provide 24/7 Assistance  Anticipated Date of Rehabilitation Admission: 11-30-18  Patient/Family oriented IRF level of care/facility/plan: Yes  Patient/Family willing to participate in IRF care/facility/plan: Yes  Patient able to tolerate IRF level of care proposed: Yes  Patient has potential to benefit IRF level of care proposed: Yes  Comments: Not Applicable    Special Needs or Precautions - Medical Necessity:  Safety Concerns/Precautions:  Fall Risk / High Risk for Falls, Balance, Cognition and Bed / Chair Alarm  Pain Management  IV Site: Peripheral  Requires Oxygen  Current Medications:    Current Facility-Administered Medications Ordered in Epic   Medication Dose Route Frequency Provider Last Rate Last Dose   • senna-docusate (PERICOLACE or SENOKOT S) 8.6-50 MG per tablet 2 Tab  2 Tab Oral BID Du Ruffin M.D.   2 Tab at 11/29/18 1700    And   • polyethylene glycol/lytes (MIRALAX) PACKET 1 Packet  1 Packet Oral QDAY PRN Du Ruffin M.D.        And   • magnesium hydroxide (MILK OF MAGNESIA) suspension 30 mL  30 mL Oral QDAY PRN Du Ruffin M.D.        And   • bisacodyl (DULCOLAX) suppository 10 mg  10 mg Rectal QDAY PRN Du Ruffin M.D.       • Respiratory Care per Protocol   Nebulization Continuous RT Du Ruffin M.D.       • NS infusion   Intravenous Continuous Du Ruffin M.D. 50 mL/hr at 11/29/18 0043     • atorvastatin (LIPITOR) tablet 40 mg  40 mg Oral Q EVENING Du Ruffin M.D.   40 mg at 11/29/18 1659   • hydrALAZINE (APRESOLINE) injection 10 mg  10 mg Intravenous Q2HRS PRN Du Ruffin M.D.       • ondansetron (ZOFRAN) syringe/vial injection 4 mg  4 mg Intravenous Q4HRS PRN Du Ruffin M.D.       • ondansetron (ZOFRAN ODT) dispertab 4 mg  4 mg Oral Q4HRS PRN Du Ruffin M.D.       • aspirin (ASA) tablet 325 mg  325 mg Oral DAILY Jos Arndt  M.D.   325 mg at 11/30/18 0543    Or   • aspirin (ASA) chewable tab 324 mg  324 mg Oral DAILY Jos Arndt M.D.        Or   • aspirin (ASA) suppository 300 mg  300 mg Rectal DAILY Jos Arndt M.D.       • enoxaparin (LOVENOX) inj 40 mg  40 mg Subcutaneous DAILY Jos Arndt M.D.   40 mg at 11/29/18 1914   • hydroxychloroquine (PLAQUENIL) tablet 400 mg  400 mg Oral BID Patrick Grier M.D.   400 mg at 11/30/18 0404   • predniSONE (DELTASONE) tablet 20 mg  20 mg Oral DAILY Patrick Grier M.D.   20 mg at 11/30/18 0544   • omeprazole (PRILOSEC) capsule 20 mg  20 mg Oral DAILY Patrick Grier M.D.   20 mg at 11/30/18 0543   • nitroglycerin 50 mg in D5W 250 ml infusion  0-200 mcg/min Intravenous Continuous Reynaldo Caraballo M.D.   Stopped at 11/28/18 2204     No current [facility]-ordered outpatient prescriptions on file.     Diet:   DIET ORDERS (Through next 24h)    Start     Ordered    11/29/18 0940  Diet Order Full Liquid (Crush meds in puree please)  ALL MEALS     Question Answer Comment   Diet: Full Liquid Crush meds in puree please   Consistency/Fluid modifications: Nectar Thick    Miscellaneous modifications: SLP - 1:1 Supervision by Nursing        11/29/18 0940          Anticipated Discharge Destination / Patient/Family Goal:  Destination: Home with Assistance Support System: Daughter  Anticipated home health services: OT, PT and SLP  Previously used HH service/ provider: Not Applicable  Anticipated DME Needs: Walker, Wheelchair and Life Line  Outpatient Services: OT, PT and SLP  Alternative resources to address additional identified needs:     Pre-Screen Completed: 11/30/2018 12:01 PM Acosta Stapleton L.P.N.

## 2018-11-30 NOTE — PROGRESS NOTES
Spoke with pt dtr, Pandontrell, plan to have pt move/live with her after discharge. Mey lives in [place]. Dtrleslie, to come in to see pt when on Friday when she arrives.

## 2018-11-30 NOTE — DISCHARGE PLANNING
Dr. Madrigal is recommending [facility].  Msg placed to Dr. Ruffin-seeking medical clearance.  Msg placed to VENITA Ambrose [phone]

## 2018-11-30 NOTE — PROGRESS NOTES
Patient admitted to facility [id number] via w/c; accompanied by hospital transport. Patient assisted to room and positioned in bed for comfort and safety; call light within reach. Patient assisted with stowing belongings and oriented to room and facility. Educational binder reviewed with patient. Admission assessment performed and documented in computer. Admission paperwork completed; signed copies placed in chart. Will continue to monitor.

## 2018-11-30 NOTE — FLOWSHEET NOTE
11/30/18 1435   Respiratory WDL   Respiratory (WDL) X   Chest Exam   Respiration 18   Pulse 88   Breath Sounds   RUL Breath Sounds Coarse Crackles   RLL Breath Sounds Diminished   JOHNSON Breath Sounds Coarse Crackles   LLL Breath Sounds Diminished   Secretions   Cough (pt states she has daily productive cough)   Oximetry   #Pulse Oximetry (Single Determination) Yes   Oxygen   Home O2 Use Prior To Admission? No   Pulse Oximetry 91 %   O2 Daily Delivery Respiratory  Room Air with O2 Available   Events   #(RT ONLY) Smoking and tobacco use cessation counseling 3-10 minutes smart text complete?  Asymptomatic

## 2018-11-30 NOTE — PROGRESS NOTES
1324 Patient transported to [facility] via med transport.

## 2018-11-30 NOTE — DISCHARGE PLANNING
Anticipated Discharge Disposition: [facility]    Action: COBRA     Informed by Acosta Rasmussen that the Dr. Navarro has accepted the pt and that transport has been set up for 13:30,    Informed Dr. Lisa Canales. He states she will complete the pt's dc order    Had pt sign COBRA. Pt's daughter at bedside.     Barriers to Discharge: none    Plan: [facility] too [facility] @[facility]30 today.

## 2018-11-30 NOTE — THERAPY
"Occupational Therapy Evaluation completed.   Functional Status:  Pt presents to skilled OT services following L medial thalamus small acute ischemic stroke with R sided deficits. Pt performed bed mobility with mod a, LB dressing cga with increased time for R hand integration during fine motor control, toileting min a, toilet t/f with min a, functional ambulation to BR in hallway with cga with w/c push. Pt RUE ROM intact, strength equal bilaterally except  which is mildly impaired compared to LUE, thumb to finger opposition impaired, demonstrates decreased safety awareness and response time minimally. Pt would benefit from acute skilled OT services and would greatly benefit from post acute therapy given pt's high PLOF and acute onset of mild R sided deficits following acute CVA.   Plan of Care: Will benefit from Occupational Therapy 4 times per week  Discharge Recommendations:  Equipment: Will Continue to Assess for Equipment Needs. Post-acute therapy Recommend inpatient transitional care services for continued occupational therapy services. Please consider physiatry (PM&R) consult.       See \"Rehab Therapy-Acute\" Patient Summary Report for complete documentation.    "

## 2018-11-30 NOTE — PROGRESS NOTES
"Chief Complaint   Patient presents with   • Possible Stroke     tpa given, right sided weakness, left sided facial droop.        Problem List Items Addressed This Visit     None      Visit Diagnoses     Acute CVA (cerebrovascular accident) (HCC)        Relevant Medications    nitroglycerin 50 mg in D5W 250 ml infusion    atorvastatin (LIPITOR) tablet 40 mg    hydrALAZINE (APRESOLINE) injection 10 mg    enoxaparin (LOVENOX) inj 40 mg    losartan (COZAAR) 50 MG Tab      Neurology Progress Note    History of present illness:  This is a 77-year old female with PMHx significant for dyslipidemia, hypertension, tobacco abuse (1/2 ppd) and remote history of stroke with mild residual Right sided deficits at baseline who presented to [facility] on 11/28/18 for a chief complaint of worsening Right sided weakness and right facial weakness per daughter. The patient is a poor historian, further details of HPI obtained via review of patient's medical record.   The patient was apparently last seen in her usual state of health around 1630 on 11/28; she was apparently driving to the store when she suddenly became \"dizzy.\" Patient admits that her \"vision felt off,\" however has cannot further describe or characterize this sensation. Patient's daughter (whom patient was with at the time) apparently noted worsening Right sided weakness and Right facial droop as well, thus she was brought to an OSH for further evaluation. CT Brain at that time revealed no acute intracranial abnormality; CTA without acute thrombus or LVO; patient's exclusion criteria was reviewed and patient was determined to be a candidate for IV tPA. Patient received IV tPA bolus at approximately 1845 on 11/28/18.   Currently, patient is sitting up in bed; briskly arousable to voice. States that she feels \"almost back to normal.\" Denies headache or dizziness. Denies weakness, numbness or tingling to any part of the body; admits to mild RUE/RLE to which she admits " "is baseline. Denies problems with vision (double vision, blurred vision, or loss of vision), speech or swallowing. Denies recent falls or recent trauma/head trauma.      Neurology has been consulted by Dr. Du Ruffin to further evaluate the findings noted above.     Interval, 11/30/18:    Patient is sitting up in bed; awake and alert. States that she feels well and is read to go home; reports that symptoms have resolved and feels to be at her baseline. Denies headache, dizziness, focal weakness, numbness or tingling to any part of the body.     Further interview with patient reveals that she had \"a possible stroke\" 1-2 years ago; symptoms at that time included BLE weakness, which she states mostly resolved following the episode. She denies being prescribed/taking ASA, Plavix, or other blood thinners following the episode; also denies history of statin use. As was previously noted, she does admit to current every day tobacco abuse, usually 1/2-1 pack per day.     Except for as noted above, no changes to HPI as was previously documented.     Past medical history:   As noted above.     Past surgical history:   Non contributory.     Family history:   Non contributory.     Social history:   Social History     Social History   • Marital status:      Spouse name: N/A   • Number of children: N/A   • Years of education: N/A     Occupational History   • Not on file.     Social History Main Topics   • Smoking status: Current Some Day Smoker     Packs/day: 0.50     Types: Cigarettes   • Smokeless tobacco: Never Used   • Alcohol use No   • Drug use: No   • Sexual activity: Not on file     Other Topics Concern   • Not on file     Social History Narrative   • No narrative on file       Current medications:   Current Facility-Administered Medications   Medication Dose   • senna-docusate (PERICOLACE or SENOKOT S) 8.6-50 MG per tablet 2 Tab  2 Tab    And   • polyethylene glycol/lytes (MIRALAX) PACKET 1 Packet  1 Packet    And "   • magnesium hydroxide (MILK OF MAGNESIA) suspension 30 mL  30 mL    And   • bisacodyl (DULCOLAX) suppository 10 mg  10 mg   • Respiratory Care per Protocol     • NS infusion     • atorvastatin (LIPITOR) tablet 40 mg  40 mg   • hydrALAZINE (APRESOLINE) injection 10 mg  10 mg   • ondansetron (ZOFRAN) syringe/vial injection 4 mg  4 mg   • ondansetron (ZOFRAN ODT) dispertab 4 mg  4 mg   • aspirin (ASA) tablet 325 mg  325 mg    Or   • aspirin (ASA) chewable tab 324 mg  324 mg    Or   • aspirin (ASA) suppository 300 mg  300 mg   • enoxaparin (LOVENOX) inj 40 mg  40 mg   • hydroxychloroquine (PLAQUENIL) tablet 400 mg  400 mg   • predniSONE (DELTASONE) tablet 20 mg  20 mg   • omeprazole (PRILOSEC) capsule 20 mg  20 mg   • nitroglycerin 50 mg in D5W 250 ml infusion  0-200 mcg/min       Medication Allergy:  No Known Allergies    Review of systems:   Constitutional: denies fever, night sweats, weight loss.   Eyes: denies acute vision change, eye pain or secretion.   Ears, Nose, Mouth, Throat: denies nasal secretion, nasal bleeding, difficulty swallowing, hearing loss, tinnitus, vertigo, ear pain, acute dental problems, oral ulcers or lesions.   Endocrine: denies recent weight changes, heat or cold intolerance, polyuria, polydypsia, polyphagia,abnormal hair growth.  Cardiovascular: denies new onset of chest pain, palpitations, syncope, or dyspnea of exertion.  Pulmonary: denies shortness of breath, new onset of cough, hemoptysis, wheezing, chest pain or flu-like symptoms.   GI: denies nausea, vomiting, diarrhea, GI bleeding, change in appetite, abdominal pain, and change in bowel habits.  : denies dysuria, urinary incontinence, hematuria.  Heme/oncology: denies history of easy bruising or bleeding. No history of cancer, DVTor PE.  Allergy/immunology: denies hives/urticaria, or itching.   Dermatologic: denies new rash, or new skin lesions.  Musculoskeletal:denies joint swelling or pain, muscle pain, neck and back pain.    Neurologic: As noted above.   Psychiatric: denies symptoms of depression, suicidal or homicidal thoughts.      Physical examination:   Vitals:    11/30/18 0612 11/30/18 0800 11/30/18 0900 11/30/18 1000   BP:       Pulse:  71 100 70   Resp:  19 18 15   Temp:    36.7 °C (98 °F)   TempSrc:  Silva  Temporal   SpO2: 96% 96% 96% 94%   Weight:       Height:         General: Patient in no acute distress, pleasant and cooperative.  HEENT: Normocephalic, no signs of acute trauma.   Neck: supple, no meningeal signs or carotid bruits. There is normal range of motion. No tenderness on exam.   Chest: clear to auscultation. No cough.   CV: RRR, no murmurs.   Skin: no signs of acute rashes or trauma.   Musculoskeletal: joints exhibit full range of motion, without any pain to palpation. There are no signs of joint or muscle swelling. There is no tenderness to deep palpation of muscles.   Psychiatric: No hallucinatory behavior. Denies symptoms of depression or suicidal ideation. Mood and affect appear normal on exam.     NEUROLOGICAL EXAM:   Mental status, orientation: Awake, alert and fully oriented.   Speech and language: speech is clear and fluent, non-dysarthric. The patient is able to name, repeat and comprehend.   Memory: There is intact recollection of recent and remote events.   Cranial nerve exam: Pupils are 3-4 mm bilaterally and equally reactive to light and accommodation. Visual fields are intact by confrontation. There is no nystagmus on primary or secondary gaze. Intact full EOM in all directions of gaze. Face appears symmetric upon activation. Sensation in the face is intact to light touch. Tongue is midline and without any signs of tongue biting or fasciculations. Sternocleidomastoid muscles exhibit is normal strength bilaterally. Shoulder shrug is intact bilaterally.   Motor exam: Strength is 5/5 in LUE/LLE. Strength 4+/5 to RUE/RLE with very mild drift to RUE only. Tone is normal. No abnormal movements were seen  on exam.   Sensory exam reveals normal sense of light touch and pinprick in all extremities.   Deep tendon reflexes:  2+ throughout. Plantar responses are flexor. There is no clonus.   Coordination: shows a normal finger-nose-finger. Normal rapidly alternating movements.   Gait: Not assessed at this time.     No significant changes to exam as was documented on 11/29/18.      NIH Stroke Scale    1a Level of Consciousness   1b Orientation Questions   1c Response to Commands   2 Gaze   3 Visual Fields   4 Facial Movement   5 Motor Function (arm)   a Left   b Right   6 Motor Function (leg)   a Left   b Right   7 Limb Ataxia   8 Sensory   9 Language   10 Articulation   11 Extinction/Inattention     Score: 0    ANCILLARY DATA REVIEWED:     Lab Data Review:  Recent Results (from the past 24 hour(s))   EC-ECHOCARDIOGRAM COMPLETE W/O CONT    Collection Time: 11/29/18  4:30 PM   Result Value Ref Range    Eject.Frac. MOD BP 68.34     Eject.Frac. MOD 4C 65.3     Eject.Frac. MOD 2C 70.85     Left Ventrical Ejection Fraction 65    BASIC METABOLIC PANEL    Collection Time: 11/30/18  5:45 AM   Result Value Ref Range    Sodium 141 135 - 145 mmol/L    Potassium 4.1 3.6 - 5.5 mmol/L    Chloride 108 96 - 112 mmol/L    Co2 27 20 - 33 mmol/L    Glucose 102 (H) 65 - 99 mg/dL    Bun 11 8 - 22 mg/dL    Creatinine 0.45 (L) 0.50 - 1.40 mg/dL    Calcium 8.7 8.5 - 10.5 mg/dL    Anion Gap 6.0 0.0 - 11.9   CBC WITH DIFFERENTIAL    Collection Time: 11/30/18  5:45 AM   Result Value Ref Range    WBC 9.2 4.8 - 10.8 K/uL    RBC 4.38 4.20 - 5.40 M/uL    Hemoglobin 11.8 (L) 12.0 - 16.0 g/dL    Hematocrit 37.5 37.0 - 47.0 %    MCV 85.6 81.4 - 97.8 fL    MCH 26.9 (L) 27.0 - 33.0 pg    MCHC 31.5 (L) 33.6 - 35.0 g/dL    RDW 46.4 35.9 - 50.0 fL    Platelet Count 228 164 - 446 K/uL    MPV 9.5 9.0 - 12.9 fL    Neutrophils-Polys 65.00 44.00 - 72.00 %    Lymphocytes 25.70 22.00 - 41.00 %    Monocytes 7.60 0.00 - 13.40 %    Eosinophils 1.00 0.00 - 6.90 %     Basophils 0.20 0.00 - 1.80 %    Immature Granulocytes 0.50 0.00 - 0.90 %    Nucleated RBC 0.00 /100 WBC    Neutrophils (Absolute) 5.95 2.00 - 7.15 K/uL    Lymphs (Absolute) 2.35 1.00 - 4.80 K/uL    Monos (Absolute) 0.70 0.00 - 0.85 K/uL    Eos (Absolute) 0.09 0.00 - 0.51 K/uL    Baso (Absolute) 0.02 0.00 - 0.12 K/uL    Immature Granulocytes (abs) 0.05 0.00 - 0.11 K/uL    NRBC (Absolute) 0.00 K/uL   TSH    Collection Time: 11/30/18  5:45 AM   Result Value Ref Range    TSH 1.920 0.380 - 5.330 uIU/mL   ESTIMATED GFR    Collection Time: 11/30/18  5:45 AM   Result Value Ref Range    GFR If African American >60 >60 mL/min/1.73 m 2    GFR If Non African American >60 >60 mL/min/1.73 m 2       Labs reviewed by me.       Imaging reviewed by me:     EC-ECHOCARDIOGRAM COMPLETE W/O CONT   Final Result      MR-BRAIN-W/O   Final Result         1. Age-related cerebral atrophy.      2. Mild periventricular white matter changes consistent with chronic microvascular ischemic gliosis.      3. Acute left thalamic infarct.      DX-SHOULDER 2+ RIGHT   Final Result      1.  No acute osseous abnormality.   2.  Moderate to severe acromioclavicular and glenohumeral arthrosis.      DX-CHEST-PORTABLE (1 VIEW)   Final Result         1.  No acute cardiopulmonary disease.   2.  Atherosclerosis      CT-CTA NECK WITH & W/O-POST PROCESSING   Final Result         1.  Atherosclerotic calcification of the left common carotid artery and bilateral carotid bifurcation with less than 50% stenosis   2.  Hypoplastic left vertebral artery   3.  Otherwise unremarkable CT angiogram of the neck   4.  Right thyroid low density lesion, appearance suggests small cyst or hypodense nodule. Could be further evaluated with thyroid sonography.      CT-CTA HEAD WITH & W/O-POST PROCESS   Final Result         1.  Atherosclerotic intracranial calcification of the internal carotid arteries with less than 50% stenosis   2.  Hypoplastic distal left vertebral artery   3.   Otherwise CT angiogram of the [place] within normal limits.      OUTSIDE IMAGES-CT HEAD   Final Result      CT-CEREBRAL PERFUSION ANALYSIS   Final Result         1.  Cerebral blood flow less than 30% likely representing completed infarct = 0 mL.      2.  T Max more than 6 seconds likely representing combination of completed infarct and ischemia = 0 mL.      3.  Mismatched volume likely representing ischemic brain/pneumbra = 0 mL.      4.  Please note that the cerebral perfusion was performed on the limited brain tissue around the basal ganglia region. Infarct/ischemia outside the CT perfusion sections can be missed in this study.        CT: No acute intracranial abnormality.   CTA Brain/neck: No hemodynamically significant (>50%) stenosis.       Presumed mechanism by TOAST:  __Large Artery Atherosclerosis  _X_Small Vessel (Lacunar)  __Cardioembolic  __Other (Sickle Cell, Vasculitis, Hypercoagulable)  __Unknown    Vs. Left PCA territory.     ASSESSMENT AND PLAN:  77-year old female with dyslipidemia, hypertension, tobacco abuse (1/2 ppd) and remote history of stroke with mild residual Right sided deficits at baseline who presented to [facility] on 11/28/18 for a chief complaint of worsening Right sided weakness and right facial weakness, also with non-specific visual deficit. Patient received IV tPA at 1845 on 11/28/18; NIHSS now 0. MRI Brain has revealed small acute ischemic infarction involving Left thalamus, most likely small vessel/lacunar etiology in setting of multiple vascular risk factors for stroke (tobacco abuse, hypertension, dyslipidemia).    Impression:   Acute Left thalamic infarction; likely lacunar etiology.  History of multiple vascular risk factors for stroke, including dyslipidemia, hypertension, and tobacco abuse.    Remote history of ischemic stroke.     Recommendations/Plan:    -q4h and PRN neuro assessment. VS per nursing/unit protocol.   -Recommend BP <140/90;  antihypertensives per primary team.   -Telemetry; Screen for Afib. If no Afib identified during admission, patient may be appropriate for outpatient Loop/holter monitoring, may be arranged by patient's PCP in [place]. TTE with bubble study-- EF 65%; normal left atrium; no gross structural abnormalities.   - mg PO q day and Atorvastatin 40 mg PO q HS. Lipid panel still pending.   -Recommend aggressive BG management per primary team. A1c still pending.   -PT/OT/SLP eval and treat-- patient witnessed by nursing to ambulate to bathroom with minimal assistance; however PT/OT recommending post-acute transitional care prior to returning home independently. Case management on board for dispo.   -Counseled patient at length regarding life style and risk factor modification (including importance of smoking cessation) for secondary stroke prevention.   -All other medical management per primary team.   -DVT Prophylaxis: SCDs.     The plan of care above has been discussed with Dr. Gomes.     Shaye Armas MSN, BSN, AGNP-C  [place] [facility]

## 2018-11-30 NOTE — DISCHARGE PLANNING
[facility] spoke with Amanda, BSN [id number].  Amanda states that [facility] is medically cleared.  Case is under review by Dr. Navarro.

## 2018-11-30 NOTE — THERAPY
"Physical Therapy Evaluation completed.   Bed Mobility:  Supine to Sit: Moderate Assist  Transfers: Sit to Stand: Minimal Assist  Gait: Level Of Assist: Contact Guard Assist with WC push       Plan of Care: Will benefit from Physical Therapy 4 times per week  Discharge Recommendations: Equipment: Will Continue to Assess for Equipment Needs. Post-acute therapy Discharge to a transitional care facility for continued skilled therapy services.    See \"Rehab Therapy-Acute\" Patient Summary Report for complete documentation.       Pt is a 78 y/o female presenting after a left CVA with mild right sided deficits. Pt with nearly equal strength at RLE as compared to left and demonstrates adequate balance and coordination at the RLE. Pt with increased deficits at the RUE but was able to manage a WC push in ICU without issue. Pt with occasional safety awareness issues but responds well to min cuing to be aware of surroundings and obstacles. PT to continue working with Pt in acute setting, and would recommend post acute transitional care for further therapy prior to returning home as she was independent with all activity prior to admission.   "

## 2018-11-30 NOTE — DISCHARGE INSTRUCTIONS
Discharge Instructions    Discharged to other by [facility] with escort. Discharged via wheelchair, hospital escort: Yes.  Special equipment needed: Not Applicable    Be sure to schedule a follow-up appointment with your primary care doctor or any specialists as instructed.     Discharge Plan:   Diet Plan: Discussed  Activity Level: Discussed  Smoking Cessation Offered: Patient Refused  Confirmed Follow up Appointment: Patient to Call and Schedule Appointment  Confirmed Symptoms Management: Discussed  Medication Reconciliation Updated: Yes  Influenza Vaccine Indication: Not indicated: Previously immunized this influenza season and > 8 years of age    I understand that a diet low in cholesterol, fat, and sodium is recommended for good health. Unless I have been given specific instructions below for another diet, I accept this instruction as my diet prescription.   Other diet: nectar thick, full liquid    Special Instructions: None    · Is patient discharged on Warfarin / Coumadin?   No     Depression / Suicide Risk    As you are discharged from this [facility] facility, it is important to learn how to keep safe from harming yourself.    Recognize the warning signs:  · Abrupt changes in personality, positive or negative- including increase in energy   · Giving away possessions  · Change in eating patterns- significant weight changes-  positive or negative  · Change in sleeping patterns- unable to sleep or sleeping all the time   · Unwillingness or inability to communicate  · Depression  · Unusual sadness, discouragement and loneliness  · Talk of wanting to die  · Neglect of personal appearance   · Rebelliousness- reckless behavior  · Withdrawal from people/activities they love  · Confusion- inability to concentrate     If you or a loved one observes any of these behaviors or has concerns about self-harm, here's what you can do:  · Talk about it- your feelings and reasons for harming yourself  · Remove any means  that you might use to hurt yourself (examples: pills, rope, extension cords, firearm)  · Get professional help from the community (Mental Health, Substance Abuse, psychological counseling)  · Do not be alone:Call your Safe Contact- someone whom you trust who will be there for you.  · Call your local CRISIS HOTLINE 507-5901 or 755-134-1037  · Call your local Children's Mobile Crisis Response Team [place] (783) 879-6737 or www.[employer]  · Call the toll free National Suicide Prevention Hotlines   · National Suicide Prevention Lifeline 116-890-BYZO (0336)  · National Hope Line Network 800-SUICIDE (360-1643)

## 2018-11-30 NOTE — DISCHARGE SUMMARY
Discharge Summary    CHIEF COMPLAINT ON ADMISSION  Chief Complaint   Patient presents with   • Possible Stroke     tpa given, right sided weakness, left sided facial droop.        Reason for Admission  Stroke     CODE STATUS  Full Code    HPI & HOSPITAL COURSE  This is a 77 y.o. female here with right facial droop and hemiparesis for which she received TPA on 1128 at 6:45 PM.  Her MRI revealed an acute left thalamic infarct.  She was initially monitored in the intensive care unit, she was evaluated by neurology critical care and physiatry.  Her neurologic deficits have improved.  She was evaluated by therapies and felt to be good candidate for inpatient rehab.  She was counseled on smoking cessation and is otherwise clinically stable for discharge to rehab.  She was incidentally noted to have thyroid cyst versus nodule on her CT and will need outpatient follow-up.           Therefore, she is discharged in good and stable condition to an inpatient rehabilitation hospital.    The patient met 2-midnight criteria for an inpatient stay at the time of discharge.      FOLLOW UP ITEMS POST DISCHARGE  Right thyroid nodule that will need follow-up ultrasound as outpatient      DISCHARGE DIAGNOSES  Active Problems:    CVA (cerebral vascular accident) (HCC) POA: Yes    HLD (hyperlipidemia) POA: Yes    Tobacco use POA: Unknown    Essential hypertension POA: Unknown    Rheumatoid arthritis (HCC) POA: Unknown    Thyroid nodule POA: Unknown  Resolved Problems:    Leukocytosis POA: Yes      FOLLOW UP  No future appointments.  No follow-up provider specified.    MEDICATIONS ON DISCHARGE     Medication List      START taking these medications      Instructions   aspirin 325 MG Tabs  Start taking on:  12/1/2018  Commonly known as:  ASA   Take 1 Tab by mouth every day.  Dose:  325 mg     atorvastatin 40 MG Tabs  Commonly known as:  LIPITOR   Take 1 Tab by mouth every evening.  Dose:  40 mg     enoxaparin 40 MG/0.4ML Soln inj  Commonly  known as:  LOVENOX   Inject 40 mg as instructed every day.  Dose:  40 mg     ondansetron 4 MG Tbdp  Commonly known as:  ZOFRAN ODT   Take 1 Tab by mouth every four hours as needed (give PO if IV route is unavailable. May give per feeding tube.).  Dose:  4 mg     polyethylene glycol/lytes Pack  Commonly known as:  MIRALAX   Take 1 Packet by mouth 1 time daily as needed (if sennosides and docusate ineffective after 24 hours).  Dose:  17 g     senna-docusate 8.6-50 MG Tabs  Commonly known as:  PERICOLACE or SENOKOT S   Take 2 Tabs by mouth 2 Times a Day.  Dose:  2 Tab        CONTINUE taking these medications      Instructions   hydroxychloroquine 200 MG Tabs  Commonly known as:  PLAQUENIL   Take 400 mg by mouth 2 times a day.  Dose:  400 mg     losartan 50 MG Tabs  Commonly known as:  COZAAR   Take 75 mg by mouth every day.  Dose:  75 mg     predniSONE 20 MG Tabs  Commonly known as:  DELTASONE   Take 20 mg by mouth every day.  Dose:  20 mg     PROTONIX 20 MG tablet  Generic drug:  pantoprazole   Take 20 mg by mouth every day.  Dose:  20 mg        STOP taking these medications    celecoxib 200 MG Caps  Commonly known as:  CELEBREX     meclizine 25 MG Tabs  Commonly known as:  ANTIVERT            Allergies  No Known Allergies    DIET  Orders Placed This Encounter   Procedures   • Diet Order Full Liquid (Crush meds in puree please)     Standing Status:   Standing     Number of Occurrences:   1     Order Specific Question:   Diet:     Answer:   Full Liquid [11]     Comments:   Crush meds in puree please     Order Specific Question:   Consistency/Fluid modifications:     Answer:   Nectar Thick [2]     Order Specific Question:   Miscellaneous modifications:     Answer:   SLP - 1:1 Supervision by Nursing [21]       ACTIVITY  As tolerated.  Weight bearing as tolerated    LINES, DRAINS, AND WOUNDS  This is an automated list. Peripheral IVs will be removed prior to discharge.  Peripheral IV 11/29/18 20 G Left Antecubital  (Active)   Site Assessment Clean;Dry;Intact 11/30/2018  8:00 AM   Dressing Type Transparent 11/30/2018  8:00 AM   Line Status Blood return noted;Infusing;Flushed 11/30/2018  8:00 AM   Dressing Status Clean;Dry;Intact 11/30/2018  8:00 AM   Dressing Intervention N/A 11/30/2018  8:00 AM   Date Primary Tubing Changed 11/29/18 11/30/2018  8:00 AM   NEXT Primary Tubing Change  12/01/18 11/30/2018  8:00 AM   [place] [facility] [place] [facility] 11/30/2018  8:00 AM   Phlebitis Scale ([facility] Only) 0 11/30/2018  8:00 AM       Peripheral IV 11/29/18 20 G Left Hand (Active)   Site Assessment Clean;Dry;Intact 11/30/2018  8:00 AM   Dressing Type Transparent 11/30/2018  8:00 AM   Line Status Blood return noted;Saline locked;Flushed 11/30/2018  8:00 AM   Dressing Status Clean;Dry;Intact 11/30/2018  8:00 AM   Dressing Intervention N/A 11/29/2018  8:00 AM   Infil[place] Grading [place] [facility] 11/30/2018  8:00 AM   Phlebitis Scale ([facility] Only) 0 11/30/2018  8:00 AM     Urethral Catheter Temperature probe 16 Fr. (Active)   Site Assessment Clean;Skin intact 11/30/2018  8:00 AM   Collection Container Standard drainage bag 11/30/2018  8:00 AM   Urinary Catheter Care Tamper Evident Seal in Place;Drainage Tube Extended;Drainage Bag Not Overfilled;Drainage Tubing Properly Secured;Cath Care Done with Soap & Water;Drainage Bag Below Bladder Level and Not on Floor;Cath Care done with CHG Wipes 11/30/2018  8:00 AM   Securement Method Securing device (Describe) 11/30/2018  8:00 AM   Output (mL) 250 mL 11/30/2018 10:00 AM      Wound 11/29/18 Skin Tear Arm (Active)   Site Assessment Clean;Dry;Intact;Pink 11/30/2018  8:00 AM   Margins Defined edges 11/30/2018  8:00 AM   Closure Open to air 11/30/2018  8:00 AM   Drainage Amount None 11/30/2018  8:00 AM       Peripheral IV 11/29/18 20 G Left Antecubital (Active)   Site Assessment Clean;Dry;Intact 11/30/2018  8:00 AM   Dressing Type Transparent 11/30/2018  8:00 AM   Line Status Blood return  noted;Infusing;Flushed 11/30/2018  8:00 AM   Dressing Status Clean;Dry;Intact 11/30/2018  8:00 AM   Dressing Intervention N/A 11/30/2018  8:00 AM   Date Primary Tubing Changed 11/29/18 11/30/2018  8:00 AM   NEXT Primary Tubing Change  12/01/18 11/30/2018  8:00 AM   Infiltration Grading ([facility], [facility]) 0 11/30/2018  8:00 AM   Phlebitis Scale ([facility] Only) 0 11/30/2018  8:00 AM       Peripheral IV 11/29/18 20 G Left Hand (Active)   Site Assessment Clean;Dry;Intact 11/30/2018  8:00 AM   Dressing Type Transparent 11/30/2018  8:00 AM   Line Status Blood return noted;Saline locked;Flushed 11/30/2018  8:00 AM   Dressing Status Clean;Dry;Intact 11/30/2018  8:00 AM   Dressing Intervention N/A 11/29/2018  8:00 AM   Infiltration Grading ([facility], [facility]) 0 11/30/2018  8:00 AM   Phlebitis Scale ([facility] Only) 0 11/30/2018  8:00 AM           Urethral Catheter Temperature probe 16 Fr. (Active)   Site Assessment Clean;Skin intact 11/30/2018  8:00 AM   Collection Container Standard drainage bag 11/30/2018  8:00 AM   Urinary Catheter Care Tamper Evident Seal in Place;Drainage Tube Extended;Drainage Bag Not Overfilled;Drainage Tubing Properly Secured;Cath Care Done with Soap & Water;Drainage Bag Below Bladder Level and Not on Floor;Cath Care done with CHG Wipes 11/30/2018  8:00 AM   Securement Method Securing device (Describe) 11/30/2018  8:00 AM   Output (mL) 250 mL 11/30/2018 10:00 AM        MENTAL STATUS ON TRANSFER  Level of Consciousness: Alert  Orientation : Oriented x 4  Speech: Speech Clear    CONSULTATIONS  Neurology  Physiatry  Critical care    PROCEDURES  None    LABORATORY  Lab Results   Component Value Date    SODIUM 141 11/30/2018    POTASSIUM 4.1 11/30/2018    CHLORIDE 108 11/30/2018    CO2 27 11/30/2018    GLUCOSE 102 (H) 11/30/2018    BUN 11 11/30/2018    CREATININE 0.45 (L) 11/30/2018        Lab Results   Component Value Date    WBC 9.2 11/30/2018    HEMOGLOBIN 11.8 (L) 11/30/2018    HEMATOCRIT 37.5 11/30/2018     PLATELETCT 228 11/30/2018        Total time of the discharge process exceeds 33 minutes.

## 2018-11-30 NOTE — DISCHARGE PLANNING
Dr. Navarro has accepted.  Transport has been arranged for 1330.  VENITA Ambrose [id number] & MARY KAY PatelN [id number] are aware.

## 2018-11-30 NOTE — PROGRESS NOTES
This note is for documentation purposes only.    This is a 77 y.o. female with a PMH of RA, HTN, HLD, and tobacco abuse who presented on 11/28/18 to ER with right facial droop and right sided weakness.  Per report patient developed the right facial droop in the early afternoon and was found by daughter. She was taken to outside facility where CT head without contrast was negative. Patient met criteria for tPA and was given at OSH before being transferred to [facility] for higher level of care. Patient underwent CTA and found ICAs to have less than 50% stenosis. MRI brain showed acute left thalamic infarct.  TTE showed an EF of 65%.  Neurology was consulted and recommended permissive hypertension and  mg and atorvastatin 40 mg QHS.   While in ICU she was allowed to be in permissive hypertension.  Patient neurologic deficits have slowly improved per physiatry consult.    Patient was evaluated by PT on 11/30/18 and was found to be CGA for gait.  Last evaluated by OT On 11/30/18 and was Christine for ADLs.  Patient was evaluated by SLP for possible dysphagia and recommending NTFL diet at this time.     Patient was transferred to [facility] on 11/30/18 and unfortunately their payer source was not a provider for [facility]. Patient transferred to in network [facility].

## 2018-11-30 NOTE — CARE PLAN
Problem: Safety  Goal: Will remain free from injury    Intervention: Provide assistance with mobility  Patient assisted with ambulation to the restroom with PT/OT. Patient up to chair and commode with RN.      Problem: Urinary Elimination:  Goal: Ability to reestablish a normal urinary elimination pattern will improve    Intervention: Evaluate need to continue indwelling urinary catheter  Patient is being discharged to rehab facility. Patient has orders to discontinue amaral catheter.      Problem: Safety:  Goal: Will remain free from injury    Intervention: Provide assistance with mobility  Patient assisted with ambulation to the restroom with PT/OT. Patient up to chair and commode with RN.

## 2018-11-30 NOTE — CARE PLAN
Problem: Communication:  Goal: The ability to communicate needs accurately and effectively will improve  Outcome: PROGRESSING AS EXPECTED  Pt able to communicate needs effectively.    Problem: Mobility  Goal: Risk for activity intolerance will decrease    Intervention: Encourage patient to increase activity level in collaboration with Interdisciplinary Team  Pt encouraged to mobilize to EOB post alteplase. PT/OT assessment pending.

## 2018-12-01 NOTE — ASSESSMENT & PLAN NOTE
Losartan outpt  Permissive htn keep < 180 for tpa  Now goal less than 140/90  Follow up with neurology outpt

## 2018-12-01 NOTE — PROGRESS NOTES
Due to insurance denial, arrangements were made to transfer pt to [facility]. Pt, with dtr at bedside, signed [employer]. Verbalized understanding of need for transfer. Call report to Antonette at [facility]. [facility] transport here and pt placed in wc. Dc'd at 1815.

## 2018-12-01 NOTE — DISCHARGE INSTRUCTIONS
[facility] NURSING DISCHARGE INSTRUCTIONS    Blood Pressure : 139/85  Weight: 76.5 kg (168 lb 9.6 oz)  Nursing recommendations for Joselyn Nolan at time of discharge are as follows:  Client verbalized understanding of all discharge instructions and prescriptions.     Review all your home medications and newly ordered medications with your doctor and/or pharmacist. Follow medication instructions as directed by your doctor and/or pharmacist.    Pain Management:   Discharge Pain Medication Instructions:     Notify your primary care provider if pain is unrelieved with these measures, if the pain is new, or increased in intensity.    Discharge Skin Characteristics: Warm, Dry  Discharge Skin Exam: Bruise (Indicate Location In Comment) (scattered on arms, legs)     Skin / Wound Care Instructions: Please contact your primary care physician for any change in skin integrity.     If You Have Surgical Incisions / Wounds:  Monitor surgical site(s) for signs of increased swelling, redness or symptoms of drainage from the site or fever as this could indicate signs and symptoms of infection. If these symptoms are noted, notifiy your primary care provider.      Discharge Safety Instructions: Should Have ADULT SUPERVISION     Discharge Safety Concerns: Balance Problems (Dizziness, Light Headedness)  The interdisciplinary team has made recommendation that you should have adult supervision in the house due to weakness and unsteady gait  Anti-embolic stockings are not required to increase circulation to the lower extremities.    Discharge Diet: Diabetic, Full liquid     Discharge Liquids: nectar thick  Discharge Bowel Function: Continent  Please contact your primary care physician for any changes in bowel habits.  Discharge Bowel Program:    Discharge Bladder Function: Continent  Discharge Urinary Devices: None      Nursing Discharge Plan:   Smoking Cessation Offered: Patient Counseled    Case Management Discharge  Instructions:   Discharge Location:    Agency Name/Address/Phone:    Home Health:    Outpatient Services:    DME Provider/Phone:    Medical Equipment Ordered:    Prescription Faxed to:        Discharge Medication Instructions:  Below are the medications your physician expects you to take upon discharge:    Rehabilitation After a Stroke, Adult  Introduction  A stroke can cause many types of problems. The treatment of stroke involves three stages:  · Prevention.  · Treatment immediately following a stroke.  · Rehabilitation after a stroke.  How is my rehabilitation plan developed?  A detailed exam by your health care provider helps outline what problems were caused by the stroke. Your health care provider may consult specialists. The specialists may include doctors, occupational and physical therapists, and speech therapists. It is then possible to make a plan that best fits your needs.  Your evaluation might include the following:  · Evaluation of your ability to do daily activities that require using muscles, coordination, vision, reasoning, memory, and problem solving. Interviews with you and your health care provider will help determine what you could do and could not do before the stroke.  · Evaluation of your ability to do personal self-care tasks, such as dressing, grooming, and eating.  · Tests to see if there are sensory and motor changes due to the stroke, especially in the hands and legs.  What are the types of rehabilitation?  Your health care provider may have you start rehabilitation right away depending on the type and severity of your stroke. Rehabilitation after a stroke is focused on getting function back and preventing another stroke. Rehabilitation might include:  · Physical therapy. This can include help with walking, sitting, lying down, and balance. It may also be designed to help prevent shortening of the muscles (contractures) and swelling (edema).  · Occupational therapy. This therapy helps  you to relearn skills needed for leading a normal life. These could include eating, using the restroom, dressing, and taking care of yourself. It helps to make you more independent.  · Vision therapy. This can help you to retrain, strengthen, and improve your vision after a stroke.  · Speech therapy. This can help to improve your speech and communication skills. It also teaches you and your family members to cope with problems of being unable to communicate.  · Cognitive therapy. This therapy can help with problems caused by lack of memory, attention, or concentration.  · Psychological or psychiatric therapy. This can help you cope with problems of frustration and emotional problems that may develop after a stroke.  This information is not intended to replace advice given to you by your health care provider. Make sure you discuss any questions you have with your health care provider.  Document Released: 01/06/2009 Document Revised: 05/25/2017 Document Reviewed: 05/22/2014  [employer] Interactive Patient Education © 2017 [employer]    Dysphagia  Swallowing problems (dysphagia) occur when solids and liquids seem to stick in your throat on the way down to your stomach, or the food takes longer to get to the stomach. Other symptoms include regurgitating food, noises coming from the throat, chest discomfort with swallowing, and a feeling of fullness or the feeling of something being stuck in your throat when swallowing. When blockage in your throat is complete, it may be associated with drooling.  CAUSES   Problems with swallowing may occur because of problems with the muscles. The food cannot be propelled in the usual manner into your stomach. You may have ulcers, scar tissue, or inflammation in the tube down which food travels from your mouth to your stomach (esophagus), which blocks food from passing normally into the stomach. Causes of inflammation include:  · Acid reflux from your stomach into your  esophagus.  · Infection.  · Radiation treatment for cancer.  · Medicines taken without enough fluids to wash them down into your stomach.  You may have nerve problems that prevent signals from being sent to the muscles of your esophagus to contract and move your food down to your stomach. Globus pharyngeus is a relatively common problem in which there is a sense of an obstruction or difficulty in swallowing, without any physical abnormalities of the swallowing passages being found. This problem usually improves over time with reassurance and testing to rule out other causes.  DIAGNOSIS  Dysphagia can be diagnosed and its cause can be determined by tests in which you swallow a white substance that helps illuminate the inside of your throat (contrast medium) while X-rays are taken. Sometimes a flexible telescope that is inserted down your throat (endoscopy) to look at your esophagus and stomach is used.  TREATMENT   · If the dysphagia is caused by acid reflux or infection, medicines may be used.  · If the dysphagia is caused by problems with your swallowing muscles, swallowing therapy may be used to help you strengthen your swallowing muscles.  · If the dysphagia is caused by a blockage or mass, procedures to remove the blockage may be done.  HOME CARE INSTRUCTIONS  · Try to eat soft food that is easier to swallow and check your weight on a daily basis to be sure that it is not decreasing.  · Be sure to drink liquids when sitting upright (not lying down).  SEEK MEDICAL CARE IF:  · You are losing weight because you are unable to swallow.  · You are coughing when you drink liquids (aspiration).  · You are coughing up partially digested food.  SEEK IMMEDIATE MEDICAL CARE IF:  · You are unable to swallow your own saliva?.  · You are having shortness of breath or a fever, or both.?  · You have a hoarse voice along with difficulty swallowing.  MAKE SURE YOU:  · Understand these instructions.  · Will watch your  condition.  · Will get help right away if you are not doing well or get worse.  This information is not intended to replace advice given to you by your health care provider. Make sure you discuss any questions you have with your health care provider.  Document Released: 12/15/2001 Document Revised: 01/08/2016 Document Reviewed: 06/06/2014  [employer] Interactive Patient Education © 2017 [employer]    Fall Prevention in the Home  Falls can cause injuries and can affect people from all age groups. There are many simple things that you can do to make your home safe and to help prevent falls.  What can I do on the outside of my home?  · Regularly repair the edges of walkways and driveways and fix any cracks.  · Remove high doorway thresholds.  · Trim any shrubbery on the main path into your home.  · Use bright outdoor lighting.  · Clear walkways of debris and clutter, including tools and rocks.  · Regularly check that handrails are securely fastened and in good repair. Both sides of any steps should have handrails.  · Install guardrails along the edges of any raised decks or porches.  · Have leaves, snow, and ice cleared regularly.  · Use sand or salt on walkways during winter months.  · In the garage, clean up any spills right away, including grease or oil spills.  What can I do in the bathroom?  · Use night lights.  · Install grab bars by the toilet and in the tub and shower. Do not use towel bars as grab bars.  · Use non-skid mats or decals on the floor of the tub or shower.  · If you need to sit down while you are in the shower, use a plastic, non-slip stool.  · Keep the floor dry. Immediately clean up any water that spills on the floor.  · Remove soap buildup in the tub or shower on a regular basis.  · Attach bath mats securely with double-sided non-slip rug tape.  · Remove throw rugs and other tripping hazards from the floor.  What can I do in the bedroom?  · Use night lights.  · Make sure that a bedside light is  easy to reach.  · Do not use oversized bedding that drapes onto the floor.  · Have a firm chair that has side arms to use for getting dressed.  · Remove throw rugs and other tripping hazards from the floor.  What can I do in the kitchen?  · Clean up any spills right away.  · Avoid walking on wet floors.  · Place frequently used items in easy-to-reach places.  · If you need to reach for something above you, use a sturdy step stool that has a grab bar.  · Keep electrical cables out of the way.  · Do not use floor polish or wax that makes floors slippery. If you have to use wax, make sure that it is non-skid floor wax.  · Remove throw rugs and other tripping hazards from the floor.  What can I do in the stairways?  · Do not leave any items on the stairs.  · Make sure that there are handrails on both sides of the stairs. Fix handrails that are broken or loose. Make sure that handrails are as long as the stairways.  · Check any carpeting to make sure that it is firmly attached to the stairs. Fix any carpet that is loose or worn.  · Avoid having throw rugs at the top or bottom of stairways, or secure the rugs with carpet tape to prevent them from moving.  · Make sure that you have a light switch at the top of the stairs and the bottom of the stairs. If you do not have them, have them installed.  What are some other fall prevention tips?  · Wear closed-toe shoes that fit well and support your feet. Wear shoes that have rubber soles or low heels.  · When you use a stepladder, make sure that it is completely opened and that the sides are firmly locked. Have someone hold the ladder while you are using it. Do not climb a closed stepladder.  · Add color or contrast paint or tape to grab bars and handrails in your home. Place contrasting color strips on the first and last steps.  · Use mobility aids as needed, such as canes, walkers, scooters, and crutches.  · Turn on lights if it is dark. Replace any light bulbs that burn  out.  · Set up furniture so that there are clear paths. Keep the furniture in the same spot.  · Fix any uneven floor surfaces.  · Choose a carpet design that does not hide the edge of steps of a stairway.  · Be aware of any and all pets.  · Review your medicines with your healthcare provider. Some medicines can cause dizziness or changes in blood pressure, which increase your risk of falling.  Talk with your health care provider about other ways that you can decrease your risk of falls. This may include working with a physical therapist or  to improve your strength, balance, and endurance.  This information is not intended to replace advice given to you by your health care provider. Make sure you discuss any questions you have with your health care provider.  Document Released: 12/08/2003 Document Revised: 05/16/2017 Document Reviewed: 01/22/2016  [employer] Interactive Patient Education © 2017 [employer] Inc.    Suicidal Feelings: How to Help Yourself  Suicide is the taking of one's own life. If you feel as though life is getting too tough to handle and are thinking about suicide, get help right away. To get help:  · Call your local emergency services (911 in the [place]  · Call a suicide hotline to speak with a trained counselor who understands how you are feeling. The following is a list of suicide hotlines in the [place]. For a list of hotlines in [place], visit www.suicide.org/hotlines/international/zraogr-boncyvw-qjnocqeh.html.  ¨ 7-713-363-TALK (1-579.378.8874).  ¨ 7-660-SRVTCIN (1-948.744.6380).  ¨ 1-317.925.1333. This is a hotline for Nigerian speakers.  ¨ 6-769-376-4TTY (1-372.255.3538). This is a hotline for TTY users.  ¨ 3-154-7-U-SHAKIRA (1-157.376.2531). This is a hotline for lesbian, lui, bisexual, transgender, or questioning youth.  · Contact a crisis center or a local suicide prevention center. To find a crisis center or suicide prevention center:  ¨ Call your local hospital, clinic, community  service organization, mental health center, social service provider, or health department. Ask for assistance in connecting to a crisis center.  ¨ Visit www.suicidepreventionlifeline.org/getinvolved/ for a list of crisis centers in the [place], or visit www.suicideprevention.ca/wemzjqea-ceyjp-pudxwnr/find-a-crisis-centre for a list of centers in [place].  · Visit the following websites:  ¨ National Suicide Prevention Lifeline: www.suicidepreventionlifeline.org  ¨ Hopeline: www.hopeline.[employer]  ¨ American Foundation for Suicide Prevention: www.afsp.org  ¨ The Srinivasa Project (for lesbian, lui, bisexual, transgender, or questioning youth): www.thetrevorproject.org  How can I help myself feel better?  · Promise yourself that you will not do anything drastic when you have suicidal feelings. Remember, there is hope. Many people have gotten through suicidal thoughts and feelings, and you will, too. You may have gotten through them before, and this proves that you can get through them again.  · Let family, friends, teachers, or counselors know how you are feeling. Try not to isolate yourself from those who care about you. Remember, they will want to help you. Talk with someone every day, even if you do not feel sociable. Face-to-face conversation is best.  · Call a mental health professional and see one regularly.  · Visit your primary health care provider every year.  · Eat a well-balanced diet, and space your meals so you eat regularly.  · Get plenty of rest.  · Avoid alcohol and drugs, and remove them from your home. They will only make you feel worse.  · If you are thinking of taking a lot of medicine, give your medicine to someone who can give it to you one day at a time. If you are on antidepressants and are concerned you will overdose, let your health care provider know so he or she can give you safer medicines. Ask your mental health professional about the possible side effects of any medicines you are  taking.  · Remove weapons, poisons, knives, and anything else that could harm you from your home.  · Try to stick to routines. Follow a schedule every day. Put self-care on your schedule.  · Make a list of realistic goals, and cross them off when you achieve them. Accomplishments give a sense of worth.  · Wait until you are feeling better before doing the things you find difficult or unpleasant.  · Exercise if you are able. You will feel better if you exercise for even a half hour each day.  · Go out in the sun or into nature. This will help you recover from depression faster. If you have a favorite place to walk, go there.  · Do the things that have always given you pleasure. Play your favorite music, read a good book, paint a picture, play your favorite instrument, or do anything else that takes your mind off your depression if it is safe to do.  · Keep your living space well lit.  · When you are feeling well, write yourself a letter about tips and support that you can read when you are not feeling well.  · Remember that life’s difficulties can be sorted out with help. Conditions can be treated. You can work on thoughts and strategies that serve you well.  This information is not intended to replace advice given to you by your health care provider. Make sure you discuss any questions you have with your health care provider.  Document Released: 06/23/2004 Document Revised: 08/16/2017 Document Reviewed: 04/14/2015  [employer] Interactive Patient Education © 2017 [employer]

## 2018-12-15 LAB — EKG IMPRESSION: NORMAL

## 2019-07-13 ENCOUNTER — HOSPITAL ENCOUNTER (OUTPATIENT)
Facility: MEDICAL CENTER | Age: 79
End: 2019-07-13
Payer: COMMERCIAL

## 2019-07-13 ENCOUNTER — HOSPITAL ENCOUNTER (INPATIENT)
Dept: HOSPITAL 8 - ED | Age: 79
LOS: 4 days | Discharge: NURSING, REHAB OR LONG-TERM CARE | DRG: 40 | End: 2019-07-17
Attending: FAMILY MEDICINE | Admitting: FAMILY MEDICINE
Payer: MEDICARE

## 2019-07-13 VITALS — BODY MASS INDEX: 28.25 KG/M2 | WEIGHT: 169.54 LBS | HEIGHT: 65 IN

## 2019-07-13 DIAGNOSIS — I65.23: ICD-10-CM

## 2019-07-13 DIAGNOSIS — Z90.710: ICD-10-CM

## 2019-07-13 DIAGNOSIS — Z96.643: ICD-10-CM

## 2019-07-13 DIAGNOSIS — E78.5: ICD-10-CM

## 2019-07-13 DIAGNOSIS — F17.210: ICD-10-CM

## 2019-07-13 DIAGNOSIS — M06.9: ICD-10-CM

## 2019-07-13 DIAGNOSIS — I10: ICD-10-CM

## 2019-07-13 DIAGNOSIS — R29.705: ICD-10-CM

## 2019-07-13 DIAGNOSIS — G93.41: ICD-10-CM

## 2019-07-13 DIAGNOSIS — G81.94: ICD-10-CM

## 2019-07-13 DIAGNOSIS — I63.89: Primary | ICD-10-CM

## 2019-07-13 DIAGNOSIS — K21.9: ICD-10-CM

## 2019-07-13 DIAGNOSIS — Z71.3: ICD-10-CM

## 2019-07-13 DIAGNOSIS — G47.00: ICD-10-CM

## 2019-07-13 DIAGNOSIS — I63.9: ICD-10-CM

## 2019-07-13 DIAGNOSIS — Z79.02: ICD-10-CM

## 2019-07-13 DIAGNOSIS — E66.9: ICD-10-CM

## 2019-07-13 PROCEDURE — 99285 EMERGENCY DEPT VISIT HI MDM: CPT

## 2019-07-13 PROCEDURE — 80061 LIPID PANEL: CPT

## 2019-07-13 PROCEDURE — 93880 EXTRACRANIAL BILAT STUDY: CPT

## 2019-07-13 PROCEDURE — 70551 MRI BRAIN STEM W/O DYE: CPT

## 2019-07-13 PROCEDURE — 85730 THROMBOPLASTIN TIME PARTIAL: CPT

## 2019-07-13 PROCEDURE — 80048 BASIC METABOLIC PNL TOTAL CA: CPT

## 2019-07-13 PROCEDURE — 82040 ASSAY OF SERUM ALBUMIN: CPT

## 2019-07-13 PROCEDURE — 33285 INSJ SUBQ CAR RHYTHM MNTR: CPT

## 2019-07-13 PROCEDURE — 84443 ASSAY THYROID STIM HORMONE: CPT

## 2019-07-13 PROCEDURE — 93005 ELECTROCARDIOGRAM TRACING: CPT

## 2019-07-13 PROCEDURE — 93970 EXTREMITY STUDY: CPT

## 2019-07-13 PROCEDURE — 36415 COLL VENOUS BLD VENIPUNCTURE: CPT

## 2019-07-13 PROCEDURE — C1764 EVENT RECORDER, CARDIAC: HCPCS

## 2019-07-13 PROCEDURE — 93306 TTE W/DOPPLER COMPLETE: CPT

## 2019-07-13 PROCEDURE — 85610 PROTHROMBIN TIME: CPT

## 2019-07-13 PROCEDURE — 85025 COMPLETE CBC W/AUTO DIFF WBC: CPT

## 2019-07-16 PROCEDURE — 0JH602Z INSERTION OF MONITORING DEVICE INTO CHEST SUBCUTANEOUS TISSUE AND FASCIA, OPEN APPROACH: ICD-10-PCS | Performed by: INTERNAL MEDICINE

## 2019-07-17 VITALS — DIASTOLIC BLOOD PRESSURE: 75 MMHG | SYSTOLIC BLOOD PRESSURE: 117 MMHG

## 2022-07-31 NOTE — DISCHARGE SUMMARY
Rehab Discharge Summary    Admission Date: 11/30/2018    Discharge Date: 11/30/2018    Attending Provider: Shannon Navarro MD/PhD    Admission Diagnosis:   Active Hospital Problems    Diagnosis   • *Dysphagia   • CVA (cerebral vascular accident) (HCC)   • HLD (hyperlipidemia)   • Tobacco use   • Essential hypertension   • Rheumatoid arthritis (HCC)   • Thyroid nodule       Discharge Diagnosis:  Active Hospital Problems    Diagnosis   • *Dysphagia   • CVA (cerebral vascular accident) (HCC)   • HLD (hyperlipidemia)   • Tobacco use   • Essential hypertension   • Rheumatoid arthritis (HCC)   • Thyroid nodule       HPI per H&P:  This is a 77 y.o. female with a PMH of RA, HTN, HLD, and tobacco abuse who presented on 11/28/18 to ER with right facial droop and right sided weakness.  Per report patient developed the right facial droop in the early afternoon and was found by daughter. She was taken to outside facility where CT head without contrast was negative. Patient met criteria for tPA and was given at OSH before being transferred to [facility] for higher level of care. Patient underwent CTA and found ICAs to have less than 50% stenosis. MRI brain showed acute left thalamic infarct.  TTE showed an EF of 65%.  Neurology was consulted and recommended permissive hypertension and  mg and atorvastatin 40 mg QHS.   While in ICU she was allowed to be in permissive hypertension.  Patient neurologic deficits have slowly improved per physiatry consult.     Patient was evaluated by PT on 11/30/18 and was found to be CGA for gait.  Last evaluated by OT On 11/30/18 and was Christine for ADLs.  Patient was evaluated by SLP for possible dysphagia and recommending NTFL diet at this time.     Patient was admitted to [facility] on 11/30/2018.     Hospital Course by Problem List:  Patient was transferred to [facility] on 11/30/18 and found that their payer source was not a provider for [facility]. Patient transferred to  in-network [facility] same day.    Patient to continue on  mg and Atorvastatin 40 mg daily for post-CVA risk reduction. Current diet in [facility] diet.     Patient to start Losartan 75 mg, home dose, on 12/1/18.     Patient to restart home medications for RA including Prednisone 20 mg and Plaquenil 200 mg BID.    Patient on Lovenox 40 mg daily for DVT ppx.     IShannon M.D., personally performed a complete drug regimen review and no potential clinically significant medication issues were identified.   Discharge Medication:     Medication List      CONTINUE taking these medications      Instructions   aspirin 325 MG Tabs  Start taking on:  12/1/2018  Commonly known as:  ASA   Take 1 Tab by mouth every day.  Dose:  325 mg     atorvastatin 40 MG Tabs  Commonly known as:  LIPITOR   Take 1 Tab by mouth every evening.  Dose:  40 mg     enoxaparin 40 MG/0.4ML Soln inj  Commonly known as:  LOVENOX   Inject 40 mg as instructed every day.  Dose:  40 mg     hydroxychloroquine 200 MG Tabs  Commonly known as:  PLAQUENIL   Take 400 mg by mouth 2 times a day.  Dose:  400 mg     losartan 50 MG Tabs  Commonly known as:  COZAAR   Take 75 mg by mouth every day.  Dose:  75 mg     predniSONE 20 MG Tabs  Commonly known as:  DELTASONE   Take 20 mg by mouth every day.  Dose:  20 mg     PROTONIX 20 MG tablet  Generic drug:  pantoprazole   Take 20 mg by mouth every day.  Dose:  20 mg        STOP taking these medications    ondansetron 4 MG Tbdp  Commonly known as:  ZOFRAN ODT     polyethylene glycol/lytes Pack  Commonly known as:  MIRALAX     senna-docusate 8.6-50 MG Tabs  Commonly known as:  PERICOLACE or SENOKOT S            Discharge Diet:  Nectar Thick Full Liquid    Discharge Activity:  As tolerated     Disposition:  Patient to discharge to Skilled Nursing Facility for ongoing rehabilitation.      Equipment:  Per SNF    Follow-up & Discharge Instructions:  Follow up with your primary care provider (PCP) within 7-10 days of  discharge to review your medications and take over your care.     If you develop chest pain, fever, chills, change in neurologic function (weakness, sensation changes, vision changes), or other concerning sxs, seek immediate medical attention or call 911.      Neurology post-discharge from SNF.    Condition on Discharge:  Teena Navarro M.D.         Statement Selected